# Patient Record
Sex: FEMALE | Race: WHITE | Employment: UNEMPLOYED | URBAN - METROPOLITAN AREA
[De-identification: names, ages, dates, MRNs, and addresses within clinical notes are randomized per-mention and may not be internally consistent; named-entity substitution may affect disease eponyms.]

---

## 2019-12-05 ENCOUNTER — HOSPITAL ENCOUNTER (OUTPATIENT)
Dept: PHYSICAL THERAPY | Age: 44
Discharge: HOME | End: 2019-12-05
Payer: OTHER GOVERNMENT

## 2019-12-05 PROCEDURE — 97110 THERAPEUTIC EXERCISES: CPT

## 2019-12-05 PROCEDURE — 97162 PT EVAL MOD COMPLEX 30 MIN: CPT

## 2019-12-05 NOTE — PROGRESS NOTES
[address] PHYSICAL THERAPY AT [facility]  [address] [facility] [address], [place], [place] [address]  Phone [phone]  Fax [phone] / [address] [address] PHYSICAL THERAPY SERVICES  Patient Name: Bautista Romero : 1975   Medical   Diagnosis: Unspecified urinary incontinence [R32] Treatment Diagnosis: Urinary incontinence   Onset Date: Chronic     Referral Source: Mihcael Wilkinson MD [facility]): 2019   Prior Hospitalization: See medical history Provider #: [id number]   Prior Level of Function: Functionally I   Comorbidities: Hx of ACL repair, plantar fascitis,    Medications: Verified on Patient Summary List   The Plan of Care and following information is based on the information from the initial evaluation.   =================================================================================  Assessment / key information:  Patient is a 52 yo female  with vaginal deliveries (all large 9+ pound babies with tearing and stitching present with all) who presents to [facility] with diagnosis of urinary incontinence. Patient reports 10 year history of stress incontinence which started after delivering her third child (children range in age from 5-14). Over the last 1-2 years she reports that the frequency and volume of incontinence have increased. Patient wears Maxi pads for protection. Patient reports incontinence occurs with light activity, transferring, walking, laughing, coughing or jumping. Patient presents to PT with significant impaired strength of pelvic floor muscles scoring 2/2/2//3 on PERF. On biofeedback there was low W-R rise fast twitch contraction at 5.18 microvolts and low W-R slow twitch contraction at 5.77 microvolts. Patient scored 44 on FOTO/urinary problem indicating decreased quality of life. Musculoskeletal screen reveals decreased Core and B hip strength at 4-/5.    Patient can benefit from PT for retraining of muscle control and relaxation on biofeedback to increase pelvic floor muscle strength, decrease urinary incontinence, urgency and nocturia.    =================================================================================  Eval Complexity: History: HIGH Complexity :3+ comorbidities / personal factors will impact the outcome/ POC Exam:MEDIUM Complexity : 3 Standardized tests and measures addressing body structure, function, activity limitation and / or participation in recreation  Presentation: MEDIUM Complexity : Evolving with changing characteristics  Clinical Decision Making:MEDIUM Complexity : FOTO score of 26-74Overall Complexity:MEDIUM  Problem List: Decreased pelvic floor mm awareness, Decreased pelvic floor mm strength, Use of accessory muscles, Hypertonus of pelvic floor and Urinary urgency  Treatment Plan may include any combination of the following:   Therapeutic exercise, Urge suppression techniques, Neuromuscular re-education, Manual therapy, Physical agent/modality and Patient education  Patient / Family readiness to learn indicated by: asking questions and interest  Persons(s) to be included in education: patient (P)  Barriers to Learning/Limitations: None  Measures taken:    Patient Goal (s): Less Incontinence   Patient self reported health status: fair  Rehabilitation Potential: good    Short Term Goals: To be accomplished in  4  treatments:  1. Patient will demonstrate I and compliance with performing pelvic floor strengthening/relaxing exercises 3 times daily to demonstrate active role in rehab process. 2. Patient will report 25% subjective improvement in urinary incontinence with activity and 25% decrease in frequency of urge incontinence symptoms. 3. Patient will be I with use of urge suppression techniques to allow for improved quality of life. Long Term Goals: To be accomplished in  8  treatments:  1.  Patient will improve FOTO score from 54/100 to 62/100 to indicate improved pelvic symptoms and overall improved quality of life. 2. Patient will increase net rise of fast twitch contraction to 10 microvolts to increase continence and allow for return to PLOF. 3. Patient will increase net rise of slow twitch contraction to 15 microvolts to increase continence and allow for return to PLOF. Frequency / Duration:   Patient to be seen  1-2  times per week for 4-8  treatments:  Patient/ Caregiver education and instruction: Diagnosis, prognosis, Proper Voiding Habits, Diet, Pain Management, Exercises and Bladder Retraining  G-Codes (GP): MADHAVI  Therapist Signature: Juvenal Lopez PT, DPT Date: 60/8/8668   Certification Period: NA Time: 3:54 PM   ===========================================================================================  I certify that the above Physical Therapy Services are being furnished while the patient is under my care. I agree with the treatment plan and certify that this therapy is necessary. Physician Signature:        Date:       Time:     Please sign and return to [facility] or you may fax the signed copy to [phone]. Thank you.

## 2019-12-13 ENCOUNTER — HOSPITAL ENCOUNTER (OUTPATIENT)
Dept: PHYSICAL THERAPY | Age: 44
Discharge: HOME | End: 2019-12-13
Payer: OTHER GOVERNMENT

## 2019-12-13 PROCEDURE — 97110 THERAPEUTIC EXERCISES: CPT

## 2019-12-13 PROCEDURE — 97112 NEUROMUSCULAR REEDUCATION: CPT

## 2019-12-13 NOTE — PROGRESS NOTES
PT DAILY TREATMENT NOTE 8-    Patient Name: Maira Carlin  Date:2019  : 1975  [x]  Patient  Verified  Payor: BIB / Plan: Fady Monte 72 / Product Type: Allie Majors /    In time:1030  Out time: 1115  Total Treatment Time (min): 45  Visit #: 2 of 4-8    Treatment Area: Unspecified urinary incontinence [R32]    SUBJECTIVE    Pain Level (0-10 scale): 0  Any medication changes, allergies to medications, adverse drug reactions, diagnosis change, or new procedure performed?: [x] No    [] Yes (see summary sheet for update)  Subjective functional status/changes:   [] No changes reported  Patient retold subjective history. Reports not being able to be compliant with HEP over the last week due to time constraints. OBJECTIVE  15 min Therapeutic Exercise:  [x] See flow sheet :  Discussed urge suppression techniques and pelvic floor strengthening exercises. Issued HEP    [x]  Pelvic floor strengthening                 []  Pelvic floor downtraining  [x]  Quality pelvic floor contractions       []  Relaxation techniques  [x]  Urge suppression exercises  []  Other:  Rationale: increase strength and improve coordination  to improve the patients ability to maintain continence to improve overall quality of life. 30 min Neuromuscular Re-education:  [x]  See flow sheet : Neuro muscular training to pelvic floor for slow and fast twitch muscle groups in supine position with biofeedback - visual   [x]  Pelvic floor strengthening                 []  Pelvic floor downtraining  [x]  Quality pelvic floor contractions       []  Relaxation techniques  []  Urge suppression exercises  []  Other:  Rationale: increase strength and improve coordination  to improve the patients ability to maintain continence to improve overall quality of life.      With   [] TE   [] TA   [] neuro  [] manual   [] other: Patient Education: [x] Review HEP    [] Progressed/Changed HEP based on:   [] positioning   [] body mechanics [] transfers   [] heat/ice application    [] other:        Other Objective/Functional Measures:   Patient tolerated treatment with biofeedback well. Please see below for objective measurements. EMG Evaluation:  [] N/A [] Deferred secondary to:   Channel A: Electrode type:  [] Internal    [x] Surface    [] Vaginal    [] Rectal  Channel B: Electrode location: Left hip medial to ASIS    Slow Twitch: (10 second hold, 10 second rest)  SUPINE POSITION x10   Channel A (microvolts): 9.3 Quality:[] Quick/slow rise [x] Low net rise  Net rise (microvolts): 5.77  [x] Slow Relaxation [x] Incoordination       [] Unable to contract [] Fatigues at (sec):       [] Elevated baseline between contractions    Channel B (microvolts):1.5 Use of Accessory Muscles: [x] Minimal  Net rise (microvolts): 0.06  [] Moderate  [] Excessive       [] No use of accessory muscles    Fast Twitch (2 second hold, 4 second rest) SUPINE x 20   Channel A (microvolts): 9.8 Quality:[] Quick/slow rise [x] Low net rise  Net rise (microvolts):  5.18 [x] Slow Relaxation [x] Incoordination       [] Unable to contract [] Fatigues at (sec):       [] Elevated baseline between contractions    Channel B (microvolts): 1.2 Use of Accessory Muscles: [x] Minimal  Net rise (microvolts): -0.05  [] Moderate  [] Excessive       [] No use of accessory muscles    Pain Level (0-10 scale) post treatment: 0    ASSESSMENT/Changes in Function: Patient demonstrates impaired pelvic floor strength and coordination. Continue per POC to allow patient to maintain continence to improve overall quality of life. Patient will continue to benefit from skilled PT services to modify and progress therapeutic interventions, address strength deficits and analyze and address soft tissue restrictions to attain remaining goals.       [x]  Decrease # of leaks   [x] No change []  Improving [] Resolved     []  Decrease hypertonus [] No change []  Improving [] Resolved     [x]  Increase void interval [x] No change []  Improving [] Resolved     [x]  Increase PF strength [x] No change []  Improving [] Resolved     [x]  Increase PF endurance [x] No change []  Improving [] Resolved     []  Increase endurance [] No change []  Improving [] Resolved     []  Decrease # of pads [] No change []  Improving [] Resolved     []  Decrease pain [] No change []  Improving [] Resolved     [x]  Increased coordination [x] No change []  Improving [] Resolved     []  Increased Bowel Frequency [] No change []  Improving [] Resolved       [x]  See Plan of Care  []  See progress note/recertification  []  See Discharge Summary         Progress towards goals / Updated goals:  First session following eval . Reassess goals NV    PLAN  []  Upgrade activities as tolerated     [x]  Continue plan of care  []  Update interventions per flow sheet       []  Discharge due to:_  [x]  Other:_  Assess effects of first treatment RAIZA Gonzalez PT, DPT 12/13/2019  10:53 AM

## 2019-12-27 ENCOUNTER — HOSPITAL ENCOUNTER (OUTPATIENT)
Dept: PHYSICAL THERAPY | Age: 44
Discharge: HOME | End: 2019-12-27
Payer: OTHER GOVERNMENT

## 2019-12-27 PROCEDURE — 97110 THERAPEUTIC EXERCISES: CPT

## 2019-12-27 PROCEDURE — 97112 NEUROMUSCULAR REEDUCATION: CPT

## 2019-12-27 NOTE — PROGRESS NOTES
PT DAILY TREATMENT NOTE 8-     Patient Name: Krishna Quintana  Date:2019  : 1975  [x]? Patient  Verified  Payor:  / Plan: Ravi Garter / Product Type:  /    In time: 36 Out time: 905  Total Treatment Time (min): 45  Visit #: 3 of  4-8     Treatment Area: Unspecified urinary incontinence [R32]     SUBJECTIVE    Pain Level (0-10 scale): 0  Any medication changes, allergies to medications, adverse drug reactions, diagnosis change, or new procedure performed?: [x]? No    []? Yes (see summary sheet for update)  Subjective functional status/changes:   []? No changes reported  Patent reports somewhat compliance with HEP. Difficult with holiday activities.      OBJECTIVE  15 min Therapeutic Exercise:  [x]? See flow sheet :  Discussed urge suppression techniques and pelvic floor strengthening exercises. Issued HEP    [x]? Pelvic floor strengthening                 []?  Pelvic floor downtraining  [x]? Quality pelvic floor contractions       []? Relaxation techniques  [x]? Urge suppression exercises  []? Other:  Rationale: increase strength and improve coordination  to improve the patients ability to maintain continence to improve overall quality of life.                     30 min Neuromuscular Re-education:  [x]? See flow sheet : Neuro muscular training to pelvic floor for slow and fast twitch muscle groups in supine position with biofeedback - visual   [x]? Pelvic floor strengthening                 []?  Pelvic floor downtraining  [x]? Quality pelvic floor contractions       []? Relaxation techniques  []? Urge suppression exercises  []? Other:  Rationale: increase strength and improve coordination  to improve the patients ability to maintain continence to improve overall quality of life.      With   []? TE   []? TA   []? neuro  []? manual   []? other: Patient Education: [x]? Review HEP    []? Progressed/Changed HEP based on:   []? positioning   []?  body mechanics []? transfers   []? heat/ice application    []? other:          Other Objective/Functional Measures:              Patient tolerated treatment with biofeedback well. Please see below for objective measurements. EMG Evaluation:  []? N/A      []? Deferred secondary to:   Channel A: Electrode type:  []? Internal    [x]? Surface    []? Vaginal    []? Rectal  Channel B: Electrode location: Left hip medial to ASIS     Slow Twitch: (10 second hold, 10 second rest)  SUPINE POSITION x10 USED ROLLING GRAPH SCREEN  Channel A (microvolts):  8.5    Quality:[]? Quick/slow rise      [x]? Low net rise  Net rise (microvolts):   6.13                [x]? Slow Relaxation     [x]? Incoordination                                                              []? Unable to contract  []? Fatigues at (sec):                                                              []? Elevated baseline between contractions          Slow Twitch: (10 second hold, 10 second rest)  Hooklying POSITION x10 USED ROLLING GRAPH SCREEN  Channel A (microvolts):  8.5   Quality:[]? Quick/slow rise      [x]? Low net rise  Net rise (microvolts):    7.31               [x]? Slow Relaxation     [x]? Incoordination                                                              []? Unable to contract  []? Fatigues at (sec):                                                              []? Elevated baseline between contractions         Slow Twitch: (10 second hold, 10 second rest)  seated POSITION x10 USED Bar GRAPH SCREEN  Channel A (microvolts):  2.0    Quality:[]? Quick/slow rise      [x]? Low net rise  Net rise (microvolts):   1.53               [x]? Slow Relaxation     [x]? Incoordination                                                              []? Unable to contract  []?  Fatigues at (sec):                                                              []? Elevated baseline between contractions       Pain Level (0-10 scale) post treatment: 0     ASSESSMENT/Changes in Function: Patient continues to demonstrate impaired pelvic floor strength and coordination. PT initiated biofeedback for slow twitch fibers in hooklying and seated positions. PAtient demonstrates significantly decreased average contraction and net rise in seated compared to supine/hooklying. Continue per POC to allow patient to maintain continence to improve overall quality of life. Patient will continue to benefit from skilled PT services to modify and progress therapeutic interventions, address strength deficits and analyze and address soft tissue restrictions to attain remaining goals.        [x]? Decrease # of leaks    [x]? No change []? Improving []? Resolved      []? Decrease hypertonus []? No change []? Improving []? Resolved      [x]? Increase void interval [x]? No change []? Improving []? Resolved      [x]? Increase PF strength [x]? No change []? Improving []? Resolved      [x]? Increase PF endurance [x]? No change []? Improving []? Resolved      []? Increase endurance []? No change []? Improving []? Resolved      []? Decrease # of pads []? No change []? Improving []? Resolved      []? Decrease pain []? No change []? Improving []? Resolved      [x]? Increased coordination [x]? No change []? Improving []? Resolved      []? Increased Bowel Frequency []? No change []? Improving []? Resolved         [x]? See Plan of Care  []? See progress note/recertification  []? See Discharge Summary         Progress towards goals / Updated goals: · Short Term Goals: To be accomplished in  4  treatments:  · 1. Patient will demonstrate I and compliance with performing pelvic floor strengthening/relaxing exercises 3 times daily to demonstrate active role in rehab process. Progressing with partial compliance reported by patient 12/27/2019   · 2. Patient will report 25% subjective improvement in urinary incontinence with activity and 25% decrease in frequency of urge incontinence symptoms. · 3. Patient will be I with use of urge suppression techniques to allow for improved quality of life.      · Long Term Goals: To be accomplished in  8  treatments:  · 1. Patient will improve FOTO score from 54/100 to 62/100 to indicate improved pelvic symptoms and overall improved quality of life. · 2. Patient will increase net rise of fast twitch contraction to 10 microvolts to increase continence and allow for return to PLOF.   · 3. Patient will increase net rise of slow twitch contraction to 15 microvolts to increase continence and allow for return to PLOF. Ongoing with net rise ~6.13 uV in supine. 12/27/2019      PLAN  [x]? Upgrade activities as tolerated     [x]? Continue plan of care  []? Update interventions per flow sheet       []? Discharge due to:_  []?   Other:_  Juvenal Lopez, PT, DPT    12/27/2019                  10:53 AM

## 2020-01-03 ENCOUNTER — HOSPITAL ENCOUNTER (OUTPATIENT)
Dept: PHYSICAL THERAPY | Age: 45
Discharge: HOME | End: 2020-01-03
Payer: OTHER GOVERNMENT

## 2020-01-03 PROCEDURE — 97112 NEUROMUSCULAR REEDUCATION: CPT

## 2020-01-03 PROCEDURE — 97110 THERAPEUTIC EXERCISES: CPT

## 2020-01-03 NOTE — PROGRESS NOTES
PT DAILY TREATMENT NOTE 8-     Patient Name: Tarah Ferreira  Date:2020  : 1975  [x]? Patient  Verified  Payor: BIB / Plan: Maci Walton / Product Type:  /    In time: 250 Out time: 336  Total Treatment Time (min): 46  Visit #: 4 of  4-8     Treatment Area: Unspecified urinary incontinence [R32]     SUBJECTIVE    Pain Level (0-10 scale): 0  Any medication changes, allergies to medications, adverse drug reactions, diagnosis change, or new procedure performed?: [x]? No    []? Yes (see summary sheet for update)  Subjective functional status/changes:   [x]? No changes reported       OBJECTIVE  16 min Therapeutic Exercise:  [x]? See flow sheet :  Reviewed urge suppression and relaxation techniques and pelvic floor strengthening exercises. Issued updated HEP    [x]? Pelvic floor strengthening                 []?  Pelvic floor downtraining  [x]? Quality pelvic floor contractions       []? Relaxation techniques  [x]? Urge suppression exercises  []? Other:  Rationale: increase strength and improve coordination  to improve the patients ability to maintain continence to improve overall quality of life.                     30 min Neuromuscular Re-education:  [x]? See flow sheet : Neuro muscular training to pelvic floor for slow and fast twitch muscle groups in supine position with biofeedback - visual   [x]? Pelvic floor strengthening                 []?  Pelvic floor downtraining  [x]? Quality pelvic floor contractions       []? Relaxation techniques  []? Urge suppression exercises  []? Other:  Rationale: increase strength and improve coordination  to improve the patients ability to maintain continence to improve overall quality of life.      With   []? TE   []? TA   []? neuro  []? manual   []? other: Patient Education: [x]? Review HEP    []? Progressed/Changed HEP based on:   []? positioning   []? body mechanics   []? transfers   []? heat/ice application    []?  other:       Other Objective/Functional Measures:              Patient tolerated treatment with biofeedback well. Please see below for objective measurements. EMG Evaluation:  []? N/A      []? Deferred secondary to:   Channel A: Electrode type:  []? Internal    [x]? Surface    []? Vaginal    []? Rectal  Channel B: Electrode location: Left hip medial to ASIS       Slow Twitch: (10 second hold, 10 second rest)  Hooklying POSITION x10 USED ROLLING GRAPH SCREEN  Channel A (microvolts):  15.4   Quality:[]? Quick/slow rise      [x]? Low net rise  Net rise (microvolts):    11.00               [x]? Slow Relaxation     [x]? Incoordination                                                              []? Unable to contract  []? Fatigues at (sec):                                                              []? Elevated baseline between contractions         Slow Twitch: (10 second hold, 10 second rest)  seated POSITION x10 USED Bar GRAPH SCREEN  Channel A (microvolts):  5.6    Quality:[]? Quick/slow rise      [x]? Low net rise  Net rise (microvolts):   3.36             [x]? Slow Relaxation     [x]? Incoordination                                                              []? Unable to contract  []? Fatigues at (sec):                                                              []? Elevated baseline between contractions       Pain Level (0-10 scale) post treatment: 0     ASSESSMENT/Changes in Function: Patient is demonstrating progress with improved avearge contraction and net rise values for hooklying and seated positions for slow twitch fibers. Patent also demonstrates decreased resting tone in seated position at 2.2uV. Continue per POC to allow patient to maintain continence to improve overall quality of life.    Patient will continue to benefit from skilled PT services to modify and progress therapeutic interventions, address strength deficits and analyze and address soft tissue restrictions to attain remaining goals.        [x]? Decrease # of leaks    [x]? No change []? Improving []? Resolved      []? Decrease hypertonus []? No change []? Improving []? Resolved      [x]? Increase void interval []? No change [x]? Improving []? Resolved      [x]? Increase PF strength []? No change [x]? Improving []? Resolved      [x]? Increase PF endurance []? No change [x]? Improving []? Resolved      []? Increase endurance []? No change []? Improving []? Resolved      []? Decrease # of pads []? No change []? Improving []? Resolved      []? Decrease pain []? No change []? Improving []? Resolved      [x]? Increased coordination [x]? No change [x]? Improving []? Resolved      []? Increased Bowel Frequency []? No change []? Improving []? Resolved         [x]? See Plan of Care  []? See progress note/recertification  []? See Discharge Summary         Progress towards goals / Updated goals: · Short Term Goals: To be accomplished in  4  treatments:  · 1. Patient will demonstrate I and compliance with performing pelvic floor strengthening/relaxing exercises 3 times daily to demonstrate active role in rehab process. Progressing with partial compliance reported by patient 12/27/2019   · 2. Patient will report 25% subjective improvement in urinary incontinence with activity and 25% decrease in frequency of urge incontinence symptoms. · 3. Patient will be I with use of urge suppression techniques to allow for improved quality of life.      · Long Term Goals: To be accomplished in  8  treatments:  · 1. Patient will improve FOTO score from 54/100 to 62/100 to indicate improved pelvic symptoms and overall improved quality of life. · 2. Patient will increase net rise of fast twitch contraction to 10 microvolts to increase continence and allow for return to PLOF.   · 3. Patient will increase net rise of slow twitch contraction to 15 microvolts to increase continence and allow for return to PLOF.   Progressing with 11.0 uV 1/3/2020      PLAN  [x]? Upgrade activities as tolerated     [x]? Continue plan of care  []? Update interventions per flow sheet       []? Discharge due to:_  [x]?   Other:_Reassessment due Zehra [id number], PT, DPT    1/3/2020              10:53 AM

## 2020-01-08 ENCOUNTER — APPOINTMENT (OUTPATIENT)
Dept: PHYSICAL THERAPY | Age: 45
End: 2020-01-08
Payer: OTHER GOVERNMENT

## 2020-01-09 ENCOUNTER — APPOINTMENT (OUTPATIENT)
Dept: PHYSICAL THERAPY | Age: 45
End: 2020-01-09
Payer: OTHER GOVERNMENT

## 2020-01-16 ENCOUNTER — APPOINTMENT (OUTPATIENT)
Dept: PHYSICAL THERAPY | Age: 45
End: 2020-01-16
Payer: OTHER GOVERNMENT

## 2020-01-23 ENCOUNTER — HOSPITAL ENCOUNTER (OUTPATIENT)
Dept: PHYSICAL THERAPY | Age: 45
End: 2020-01-23
Payer: OTHER GOVERNMENT

## 2020-02-12 ENCOUNTER — HOSPITAL ENCOUNTER (OUTPATIENT)
Dept: PHYSICAL THERAPY | Age: 45
Discharge: HOME | End: 2020-02-12
Payer: OTHER GOVERNMENT

## 2020-02-12 PROCEDURE — 97110 THERAPEUTIC EXERCISES: CPT

## 2020-02-12 PROCEDURE — 97112 NEUROMUSCULAR REEDUCATION: CPT

## 2020-02-12 NOTE — PROGRESS NOTES
PT DAILY TREATMENT NOTE 8-     Patient Name: Brooklyn Castellon  Date: 2020  : 1975  [x]? Patient  Verified  Payor: BIB / Plan: [facility] / Product Type:  /    In time: 130 Out time: 219  Total Treatment Time (min):  49  Visit #: 5 of  4-8     Treatment Area: Unspecified urinary incontinence [R32]     SUBJECTIVE    Pain Level (0-10 scale): 0  Any medication changes, allergies to medications, adverse drug reactions, diagnosis change, or new procedure performed?: [x]? No    []? Yes (see summary sheet for update)  Subjective functional status/changes:   [x]? No changes reported  Patient reports that she has been absent from PT due to personal and family illness. She reports about a 25% improvement from start of care in amount and frequency of leakage.      OBJECTIVE  19 min Therapeutic Exercise:  [x]? See flow sheet :  Reviewed HEP to include urge suppression and relaxation techniques and pelvic floor strengthening exercises. [x]? Pelvic floor strengthening                 []?  Pelvic floor downtraining  [x]? Quality pelvic floor contractions       []? Relaxation techniques  [x]? Urge suppression exercises  []? Other:  Rationale: increase strength and improve coordination  to improve the patients ability to maintain continence to improve overall quality of life.                     30 min Neuromuscular Re-education:  [x]? See flow sheet : Neuro muscular training to pelvic floor for slow twitch muscle groups in supine and hooklying position with biofeedback - visual   [x]? Pelvic floor strengthening                 []?  Pelvic floor downtraining  [x]? Quality pelvic floor contractions       []? Relaxation techniques  []? Urge suppression exercises  []? Other:  Rationale: increase strength and improve coordination  to improve the patients ability to maintain continence to improve overall quality of life.      With   []? TE   []? TA   []? neuro  []? manual   []? other: Patient Education: [x]? Review HEP    []? Progressed/Changed HEP based on:   []? positioning   []? body mechanics   []? transfers   []? heat/ice application    []? other:          Other Objective/Functional Measures:              Patient tolerated treatment with biofeedback well. Please see below for objective measurements. EMG Evaluation:  []? N/A      []? Deferred secondary to:   Channel A: Electrode type:  []? Internal    [x]? Surface    []? Vaginal    []? Rectal  Channel B: Electrode location: Left hip medial to ASIS       Slow Twitch: (10 second hold, 10 second rest)  Seated POSITION x10   Channel A (microvolts):  2.6uV   Quality:[]? Quick/slow rise      [x]? Low net rise  Net rise (microvolts):    1.19uV              [x]? Slow Relaxation     [x]? Incoordination                                                              []? Unable to contract  []? Fatigues at (sec):                                                              []? Elevated baseline between contractions     Slow Twitch: (10 second hold, 10 second rest)  Hooklying POSITION x10   Channel A (microvolts):  13.2    Quality:[]? Quick/slow rise      [x]? Low net rise  Net rise (microvolts):   2.82             [x]? Slow Relaxation     [x]? Incoordination                                                              []? Unable to contract  []? Fatigues at (sec):                                                              []? Elevated baseline between contractions       Pain Level (0-10 scale) post treatment: 0     ASSESSMENT/Changes in Function: Patient demonstrates progress in pelvic floor strength and coordination. She will continue to benefit from skilled PT to address pelvic floor strength, endurance, and coordination to allow for decreased frequency and amount of leakage allowing for improved quality of life. Continue per POC to allow patient to maintain continence to improve overall quality of life.    Patient will continue to benefit from skilled PT services to modify and progress therapeutic interventions, address strength deficits and analyze and address soft tissue restrictions to attain remaining goals.        [x]? Decrease # of leaks    [x]? No change []? Improving []? Resolved      []? Decrease hypertonus []? No change []? Improving []? Resolved      [x]? Increase void interval []? No change [x]? Improving []? Resolved      [x]? Increase PF strength []? No change [x]? Improving []? Resolved      [x]? Increase PF endurance []? No change [x]? Improving []? Resolved      []? Increase endurance []? No change []? Improving []? Resolved      []? Decrease # of pads []? No change []? Improving []? Resolved      []? Decrease pain []? No change []? Improving []? Resolved      [x]? Increased coordination [x]? No change [x]? Improving []? Resolved      []? Increased Bowel Frequency []? No change []? Improving []? Resolved         []? See Plan of Care  [x]? See progress note/recertification  []? See Discharge Summary         Progress towards goals / Updated goals: · Short Term Goals: To be accomplished in  4  treatments:  · 1. Patient will demonstrate I and compliance with performing pelvic floor strengthening/relaxing exercises 3 times daily to demonstrate active role in rehab process. Progressing with partial compliance reported by patient 12/27/2019   · 2. Patient will report 25% subjective improvement in urinary incontinence with activity and 25% decrease in frequency of urge incontinence symptoms. RESOLVED 2/12/2020  · 3. Patient will be I with use of urge suppression techniques to allow for improved quality of life. Progressing per patient report. 2/12/2020      · Long Term Goals: To be accomplished in  8  treatments:  · 1. Patient will improve FOTO score from 44/100 to 56/100 (urinary problem) to indicate improved pelvic symptoms and overall improved quality of life. Progressing 53/100 2/12/2020  · 2.  Patient will increase net rise of fast twitch contraction to 10 microvolts to increase continence and allow for return to PLOF. Not assessed this session. · 3. Patient will increase net rise of slow twitch contraction to 15 microvolts to increase continence and allow for return to PLOF. Progressing with 11.0 uV 1/3/2020      PLAN  [x]? Upgrade activities as tolerated     [x]? Continue plan of care  []? Update interventions per flow sheet       []? Discharge due to:_  []? Other:Continue with POC 1-2 times a week for 4-8 treatments.    Ofelia Luciano, PT, DPT    2/12/2020              10:53 AM

## 2020-02-19 ENCOUNTER — HOSPITAL ENCOUNTER (OUTPATIENT)
Dept: PHYSICAL THERAPY | Age: 45
Discharge: HOME | End: 2020-02-19
Payer: OTHER GOVERNMENT

## 2020-02-19 PROCEDURE — 97110 THERAPEUTIC EXERCISES: CPT

## 2020-02-19 PROCEDURE — 97112 NEUROMUSCULAR REEDUCATION: CPT

## 2020-02-19 NOTE — PROGRESS NOTES
PF Daily Treatment Note  Patient Name: Serafin Rushing  Date: 2019  []  Patient  Verified  Insurance:Payor:  / Plan: [facility] / Product Type:  /   In time:340   Out time:415  Total Treatment Time (min): 35  Visit #: 1 of 4-8    Treatment Area: [x] Pelvic Floor     [] Other:    SUBJECTIVE  Pain Level (0-10 scale): 0/10    Any medication changes, allergies to medications, adverse drug reactions, diagnosis change, or new procedure performed?: [x] No    [] Yes (see summary sheet for update)   Patient is a 54 yo female  with vaginal deliveries (all large 9+ pound babies with tearing and stitching present with all) who presents to [facility] with diagnosis of urinary incontinence. Patient reports 10 year history of stress incontinence which started after delivering her third child (children range in age from 5-14). Over the last 1-2 years she reports that the frequency and volume of incontinence have increased. Patient wears Maxi pads for protection. Patient reports incontinence occurs with light activity, transferring, walking, laughing, coughing or jumping. Pelvic Floor Dysfunction Evaluation   Patient presents to PT with significant impaired strength of pelvic floor muscles scoring 2/2/2//3 on PERF. On biofeedback there was low W-R rise fast twitch contraction at 5.18 microvolts and low W-R slow twitch contraction at 5.77 microvolts. Patient scored 44 on FOTO/urinary problem indicating decreased quality of life. Musculoskeletal screen reveals decreased Core and B hip strength at 4-/5. Skin Integrity:  [x] Healthy [] Red  [] Labia Atrophy [] Fragile    Sensation: [x] Intact [] Diminished:    Muscle Bulk: [x] Symmetrical  [] Well-developed [] Atrophied:  []L   []R   []B    Patient has failed previous pelvic floor muscle training?   [] Yes    [x] No    EMG Evaluation:  [] N/A [x] Deferred secondary to: time constraint    OBJECTIVE  25 min Eval    10 min Therapeutic Exercise:  [x] See flow sheet :   [x]  Pelvic floor strengthening                 []  Pelvic floor downtraining  [x]  Quality pelvic floor contractions       []  Relaxation techniques  []  Urge suppression exercises  []  Other:              min Patient Education: [x] Review HEP    [] Progressed/Changed HEP based on:   [] positioning   [] body mechanics   [] transfers   [] heat/ice application        Other Objective/Functional Measures:   Patient presents to PT with significant impaired strength of pelvic floor muscles scoring 2/2/2//3 on PERF. On biofeedback there was low W-R rise fast twitch contraction at 5.18 microvolts and low W-R slow twitch contraction at 5.77 microvolts. Patient scored 44 on FOTO/urinary problem indicating decreased quality of life. Musculoskeletal screen reveals decreased Core and B hip strength at 4-/5. Pain Level (0-10 scale) post treatment: 0/10    ASSESSMENT/Changes in Function: Patient can benefit from PT for retraining of muscle control to increase pelvic floor muscle strength, decrease urinary incontinence and urgency. []  Decrease # of leaks   [] No change []  Improving [] Resolved     []  Decrease hypertonus [] No change []  Improving [] Resolved     []  Increase void interval [] No change []  Improving [] Resolved     []  Increase PF strength [] No change []  Improving [] Resolved     []  Increase PF endurance [] No change []  Improving [] Resolved     []  Increase endurance [] No change []  Improving [] Resolved     []  Decrease # of pads [] No change []  Improving [] Resolved     []  Decrease pain [] No change []  Improving [] Resolved         [x]  See Plan of Care for established goals          PLAN  []  Upgrade activities as tolerated     []  Continue plan of care  []  Update interventions per flow sheet       []  Discharge due to:_  [x]  Other:_  Patient to be seen 1-2 times a week for 4-8 treatments.        Julio César Hester, PT 12/5/2019 1:18 PM

## 2020-02-19 NOTE — PROGRESS NOTES
PT DAILY TREATMENT NOTE -    Patient Name: Jahaira Gutiérrez  Date:2020  : 1975  [x]  Patient  Verified  Payor: BIB / Plan: [facility] / Product Type:  /    In time:928  Out time:1016  Total Treatment Time (min): 48    Visit #: 1 of -8    Treatment Area: Unspecified urinary incontinence [R32]    SUBJECTIVE    Pain Level (0-10 scale): 0  Any medication changes, allergies to medications, adverse drug reactions, diagnosis change, or new procedure performed?: [x] No    [] Yes (see summary sheet for update)  Subjective functional status/changes:   [] No changes reported  Patient reports partial compliance with HEP since her last session. OBJECTIVE  18 min Therapeutic Exercise:  [x] See flow sheet : Initiated core strengthening and PF relaxation techniques. [x]  Pelvic floor strengthening                 []  Pelvic floor downtraining  []  Quality pelvic floor contractions       [x]  Relaxation techniques  []  Urge suppression exercises  [x]  Other:  Rationale: increase strength and improve coordination  to improve the patients ability to maintain continence to improve overall quality of life. 30 min Neuromuscular Re-education:  [x]  See flow sheet : Biofeedback training with slow twitch fibers   [x]  Pelvic floor strengthening                 [x]  Pelvic floor downtraining  [x]  Quality pelvic floor contractions       [x]  Relaxation techniques  []  Urge suppression exercises  []  Other:  Rationale: increase strength and improve coordination  to improve the patients ability to maintain continence to improve overall quality of life.              With   [] TE   [] TA   [] neuro  [] manual   [] other: Patient Education: [x] Review HEP    [] Progressed/Changed HEP based on:   [] positioning   [] body mechanics   [] transfers   [] heat/ice application    [] other:        Other Objective/Functional Measures:  Introduced core strengthening therex this session to include PPT, Bridge, and Clams.               [id number] tolerated treatment with biofeedback well. Please see below for objective measurements. EMG Evaluation:  []?? N/A      []? ? Deferred secondary to:   Channel A: Electrode type:  []?? Internal    [x]? ? Surface    []? ? Vaginal    []? ? Rectal  Channel B: Electrode location: Left hip medial to ASIS        Slow Twitch: (5 second hold, 10 second rest) Supine POSITION x10   Channel A (microvolts):  10.4uV   Quality:[]? ? Quick/slow rise      [x]? ? Low net rise  Net rise (microvolts):    4.57uV              [x]? ? Slow Relaxation     [x]? ? Incoordination  RESTING TONE 5. 9uV                                       []? ? Unable to contract  []? ? Fatigues at (sec):                                                              [x]? ? Elevated baseline between contractions     Slow Twitch: (5 second hold, 10 second rest)  Hooklying POSITION x10   Channel A (microvolts):  14.0    Quality:[]? ? Quick/slow rise      [x]? ? Low net rise  Net rise (microvolts):   2.72          [x]? ? Slow Relaxation     [x]? ? Incoordination   RESTING TONE 11. 3uV            []? ? Unable to contract  []? ? Fatigues at (sec):                                                       [x]? ? Elevated baseline between contractions       Pain Level (0-10 scale) post treatment: 0/10    ASSESSMENT/Changes in Function: Patient tolerated core strengthening exercises well with no c/o pain or fatigue. Moderate cueing needed for form. Patient demonstrates high resting tone in supine position this session. PT address this with relaxation techniques and diaphragmatic breathing. Continue per POC. Patient will continue to benefit from skilled PT services to modify and progress therapeutic interventions, address strength deficits and analyze and address soft tissue restrictions to attain remaining goals.       [x]  Decrease # of leaks   [] No change [x]  Improving [] Resolved     [x]  Decrease hypertonus [x] No change []  Improving [] Resolved     [x]  Increase void interval [] No change [x]  Improving [] Resolved     [x]  Increase PF strength [] No change [x]  Improving [] Resolved     [x]  Increase PF endurance [] No change [x]  Improving [] Resolved     []  Increase endurance [] No change []  Improving [] Resolved     []  Decrease # of pads [] No change []  Improving [] Resolved     []  Decrease pain [] No change []  Improving [] Resolved     []  Increased coordination [] No change []  Improving [] Resolved     []  Increased Bowel Frequency [] No change []  Improving [] Resolved       []  See Plan of Care  [x]  See progress note/recertification  []  See Discharge Summary         Progress towards goals / Updated goals:  First session following reassessment. Address updated goals NV    PLAN  [x]  Upgrade activities as tolerated     [x]  Continue plan of care  []  Update interventions per flow sheet       []  Discharge due to:_  [x]  Other:_ Assess effects of relaxation training.       Georgette Jimenez, PT 2/19/2020  8:25 AM

## 2020-02-24 NOTE — PROGRESS NOTES
[address] [facility]  [address] [place] [address],[address], [place], [address]  Phone [phone]  Fax [phone]  PROGRESS NOTE  Patient Name: Joie Hutson : 1975   Treatment/Medical Diagnosis: Unspecified urinary incontinence [R32]   Referral Source: Matt Blair MD     Date of Initial Visit: 2019 Attended Visits: 5 Missed Visits: 4 weeks due to family illness   SUMMARY OF TREATMENT  PT has consisted of pelvic floor relaxation/strengthening via biofeedback, patient education as to pelvic floor anatomy and function , urge suppression techniques and home exercise program to improve pelvic floor strength, endurance and coordination. CURRENT STATUS  Patient has made moderate progress in PT with 2 or 6 Long term goals met. Functional progress Includes patient reporting 25% improvement in decreased frequency and amount of leakage. .  Patient demonstrates improved pelvic floor muscle strength and endurance and overall improved coordination. Patient was absent from PT for a month due to family illness and demonstrates some regression. GOALS/MEASURE OF Progress:   · Short Term Goals: To be accomplished in  4  treatments:  · 1. Patient will demonstrate I and compliance with performing pelvic floor strengthening/relaxing exercises 3 times daily to demonstrate active role in rehab process. RESOLVED 2020 HLL   · 2. Patient will report 25% subjective improvement in urinary incontinence with activity and 25% decrease in frequency of urge incontinence symptoms. RESOLVED 2020  · 3. Patient will be I with use of urge suppression techniques to allow for improved quality of life. Progressing per patient report. 2020      · Long Term Goals: To be accomplished in  8  treatments:  · 1. Patient will improve FOTO score from 44/100 to 56/100 (urinary problem) to indicate improved pelvic symptoms and overall improved quality of life.  Progressing 53/100 2/12/2020  · 2. Patient will increase net rise of fast twitch contraction to 10 microvolts to increase continence and allow for return to PLOF. Not assessed this session. · 3. Patient will increase net rise of slow twitch contraction to 15 microvolts to increase continence and allow for return to PLOF.  Progressing with 11.0 uV 1/3/2020      New Goals to be achieved in __4-8__  treatments:  1. Patient will be I in urge suppression techniques, relaxation techniques and pelvic floor strengthening exercises 2-3 times daily to demonstrate an active role in rehab process. 2.  Patient will increase score on FOTO/urinary problem  to 56  indicating improved pelvic symptoms and quality of life. 3. Patient will decrease resting tone in seated and hooklying positions to 5uV or less. 4..  Increase net rise of slow twitch contraction to 15 microvolts to increase continence and improve overall quality of life. G-Codes: NA  RECOMMENDATIONS  Continue pelvic floor PT 1-2x week for 4-8 treatments. If you have any questions/comments please contact us directly at [phone]. Thank you for allowing us to assist in the care of your patient. Therapist Signature: Lavelle Jacobo PT, DPT  Date: 2/12/2020     Time: 12:07 PM   NOTE TO PHYSICIAN:  PLEASE COMPLETE THE ORDERS BELOW AND FAX TO   [facility]: [phone]. If you are unable to process this request in 24 hours please contact our office: [phone].    ___ I have read the above report and request that my patient continue as recommended.   ___ I have read the above report and request that my patient continue therapy with the following changes/special instructions:_________________________________________________________   ___ I have read the above report and request that my patient be discharged from therapy.      Physician Signature:        Date:       Time:

## 2020-02-26 ENCOUNTER — APPOINTMENT (OUTPATIENT)
Dept: PHYSICAL THERAPY | Age: 45
End: 2020-02-26
Payer: OTHER GOVERNMENT

## 2020-03-04 ENCOUNTER — APPOINTMENT (OUTPATIENT)
Dept: PHYSICAL THERAPY | Age: 45
End: 2020-03-04
Payer: OTHER GOVERNMENT

## 2020-03-17 ENCOUNTER — HOSPITAL ENCOUNTER (OUTPATIENT)
Dept: PHYSICAL THERAPY | Age: 45
Discharge: HOME | End: 2020-03-17
Payer: OTHER GOVERNMENT

## 2020-03-17 PROCEDURE — 97110 THERAPEUTIC EXERCISES: CPT

## 2020-03-17 PROCEDURE — 97112 NEUROMUSCULAR REEDUCATION: CPT

## 2020-03-17 NOTE — PROGRESS NOTES
PT DAILY TREATMENT NOTE 8-    Patient Name: Brooklyn Castellon  Date:3/17/2020  : 1975  [x]  Patient  Verified  Payor: BIB / Plan: Fady Monte 72 / Product Type:  /    In time: 1108  Out time: 1210  Total Treatment Time (min): 62    Visit #: 2 of 4-8    Treatment Area: Unspecified urinary incontinence [R32]    SUBJECTIVE    Pain Level (0-10 scale): 0  Any medication changes, allergies to medications, adverse drug reactions, diagnosis change, or new procedure performed?: [x] No    [] Yes (see summary sheet for update)  Subjective functional status/changes:   [] No changes reported  Patient reports that she tried to perform the new core strengthening therex over the last month. Patient has not been seen x 1 month due to PT illness and scheduling issued. Patient reports about 40% improvement in symptoms since [facility]     OBJECTIVE  42/34  min Therapeutic Exercise:  [x] See flow sheet : 8 min NC for changing   [x]  Pelvic floor strengthening                 []  Pelvic floor downtraining  []  Quality pelvic floor contractions       [x]  Relaxation techniques  []  Urge suppression exercises  [x]  Other:  Core and hip strengthening program   Rationale: increase strength and improve coordination  to improve the patients ability to maintain continence to improve overall quality of life. 20 min Neuromuscular Re-education:  [x]  See flow sheet : Biofeedback training with slow twitch fibers   [x]  Pelvic floor strengthening                 [x]  Pelvic floor downtraining  [x]  Quality pelvic floor contractions       [x]  Relaxation techniques  []  Urge suppression exercises  []  Other:  Rationale: increase strength and improve coordination  to improve the patients ability to maintain continence to improve overall quality of life.              With   [] TE   [] TA   [] neuro  [] manual   [] other: Patient Education: [x] Review HEP    [] Progressed/Changed HEP based on:   [] positioning   [] body mechanics   [] transfers   [] heat/ice application    [] other:        Other Objective/Functional Measures:  EMG Evaluation:  []?? N/A      []? ? Deferred secondary to:   Channel A: Electrode type:  []?? Internal    [x]? ? Surface    []? ? Vaginal    []? ? Rectal  Channel B: Electrode location: Left hip medial to ASIS        Slow Twitch: (5 second hold, 10 second rest) Supine POSITION x10   Channel A (microvolts):  8.3uV   Quality:[]? ? Quick/slow rise      [x]? ? Low net rise  Net rise (microvolts):    6.84uV              [x]? ? Slow Relaxation     [x]? ? Incoordination  RESTING TONE 1.5uV (decreased)                []? ? Unable to contract  []? ? Fatigues at (sec):                                                              [x]? ? Elevated baseline between contractions     Slow Twitch: (5 second hold, 10 second rest)  Hooklying POSITION x10   Channel A (microvolts):  9.2    Quality:[]? ? Quick/slow rise      [x]? ? Low net rise  Net rise (microvolts):   26.68         [x]? ? Slow Relaxation     [x]? ? Incoordination   RESTING TONE 2.5uV            []? ? Unable to contract  []? ? Fatigues at (sec):                                                       [x]? ? Elevated baseline between contractions       Pain Level (0-10 scale) post treatment: 0/10    ASSESSMENT/Changes in Function: Patient demonstrated decreased resting tone in both supine and hooklying positions. PT had introduced relaxation techniques last session which appear to have been beneficial to patient. Despite decreased Channel A readings, patient demonstrates increased W-R rise primarily due to decreased resting tone. Patient only has had one treatment visit from last reassessment due to PT illness and scehduling issues. Therefore, treatment plan and goals remain the same. Patient will continue to benefit from pelvic rehab to address continence and core/hip strength to allow for improved quality of life.         [x]  Decrease # of leaks   [] No change [x] Improving [] Resolved     [x]  Decrease hypertonus [x] No change []  Improving [] Resolved     [x]  Increase void interval [] No change [x]  Improving [] Resolved     [x]  Increase PF strength [] No change [x]  Improving [] Resolved     [x]  Increase PF endurance [] No change [x]  Improving [] Resolved     []  Increase endurance [] No change []  Improving [] Resolved     []  Decrease # of pads [] No change []  Improving [] Resolved     []  Decrease pain [] No change []  Improving [] Resolved     []  Increased coordination [] No change []  Improving [] Resolved     []  Increased Bowel Frequency [] No change []  Improving [] Resolved       []  See Plan of Care  [x]  See progress note/recertification  []  See Discharge Summary         Progress towards goals / Updated goals: Continue with below goals. 1.  Patient will be I in urge suppression techniques, relaxation techniques and pelvic floor strengthening exercises 2-3 times daily to demonstrate an active role in rehab process. STATUS AT REASSESSMENT: Progressing. Patient reports partial compliance. 3/17/2020  2. Patient will increase score on FOTO/urinary problem to 56 / 100 to indicate improved pelvic symptoms and quality of life. Status at reassessment: 45/100 Ongoing 3/17/2020  3. Patient will decrease resting tone in seated and hooklying positions to 5uV or less to allow for improved quality of contraction and continence. STATUS AT REASSESSMENT: 1.5uV supine and 2.5uV hooklying. Progressing 3/17/2020  4. Patient will increase net rise of slow twitch contraction to 15uV to increase continence and improve overall quality of life. STATUS AT REASSESSMENT: 6.84uV Progressing 3/17/2020    PLAN  [x]  Upgrade activities as tolerated     [x]  Continue plan of care  []  Update interventions per flow sheet       []  Discharge due to:_  [x]  Other:Continue per POC 1 time a week for 4-8 treatments.      Virginia Hilliard, PT 3/17/2020  8:25 AM

## 2020-03-25 NOTE — PROGRESS NOTES
[facility] AT [address] [place] [address] 20 [address] [address], [place], [place]  Phone [phone]  Fax [phone]  Patient Name: Joie Hutson : 1975   Treatment/Medical Diagnosis: Unspecified urinary incontinence [R32]   Referral Source: Matt Blair MD     Date of Initial Visit: 2019 Attended Visits: 7 Missed Visits: 5           Thank you for referring this patient to our care for their physical therapy needs. As of today this patient has been contacted and informed that they will be temporarily placed on hold due to the nationwide concerns over COVID-19. Patient has been issued HEP and therapy staff will continue to contact pt every 1-2 weeks via phone to monitor progress. Plan to resume physical therapy once concerns improve. If you have any questions/comments please contact us directly at [phone]. Thank you for allowing us to assist in the care of your patient.     Therapist Signature: Gene Pérez PTA Date: 3/25/2020     Time: 2:17 PM

## 2020-03-26 ENCOUNTER — APPOINTMENT (OUTPATIENT)
Dept: PHYSICAL THERAPY | Age: 45
End: 2020-03-26
Payer: OTHER GOVERNMENT

## 2020-06-24 NOTE — PROGRESS NOTES
[address] [facility] AT [facility]  [address] [facility] [address] [address],[address], [place] [address]  Phone [phone]  Fax [phone]  PROGRESS NOTE  Patient Name: Akin Milton : 1975   Treatment/Medical Diagnosis: Unspecified urinary incontinence [R32]   Referral Source: Joanna Johnson MD     Date of Initial Visit: 2019 Attended Visits: 7 Missed Visits: 4 weeks due to scheduling issues and PT illness   SUMMARY OF TREATMENT  PT has consisted of pelvic floor relaxation/strengthening via biofeedback, patient education as to pelvic floor anatomy and function , urge suppression techniques and home exercise program to improve pelvic floor strength, endurance and coordination. CURRENT STATUS  Patient reports ~40% improvement in decreased frequency and amount of leakage since start of care. Patient has only received one treatment session since last reassessment due to therapist illness and scheduling conflicts. Patient demonstrates improved pelvic floor muscle strength and endurance and overall improved coordination. GOALS/MEASURE OF Progress:   1. Patient will be I in urge suppression techniques, relaxation techniques and pelvic floor strengthening exercises 2-3 times daily to demonstrate an active role in rehab process. STATUS AT REASSESSMENT: Progressing. Patient reports partial compliance. 3/17/2020  2. Patient will increase score on FOTO/urinary problem to 56 / 100 to indicate improved pelvic symptoms and quality of life. Status at reassessment: 45/100 Ongoing 3/17/2020  3. Patient will decrease resting tone in seated and hooklying positions to 5uV or less to allow for improved quality of contraction and continence. STATUS AT REASSESSMENT: 1.5uV supine and 2.5uV hooklying. Progressing 3/17/2020  4. Patient will increase net rise of slow twitch contraction to 15uV to increase continence and improve overall quality of life.  STATUS AT REASSESSMENT: 6.84uV Progressing 3/17/2020    Continue towards above goals for 4 weeks. RECOMMENDATIONS  Continue pelvic floor PT 1x  week for 4-8 treatments. If you have any questions/comments please contact us directly at [phone]. Thank you for allowing us to assist in the care of your patient. Therapist Signature: Melba Wyatt PT, DPT  Date: 3/17/2020     Time: 12:07 PM   NOTE TO PHYSICIAN:  PLEASE COMPLETE THE ORDERS BELOW AND FAX TO   [facility]: [phone]. If you are unable to process this request in 24 hours please contact our office: [phone].    ___ I have read the above report and request that my patient continue as recommended.   ___ I have read the above report and request that my patient continue therapy with the following changes/special instructions:_________________________________________________________   ___ I have read the above report and request that my patient be discharged from therapy.      Physician Signature:        Date:       Time:

## 2020-07-13 NOTE — PROGRESS NOTES
[address] [facility] AT [facility]  [address], [place], [place]  Phone [phone]  Fax  SUMMARY  Patient Name: Sadie Xie : 1975   Treatment/Medical Diagnosis: Unspecified urinary incontinence [R32]   Referral Source: Anand Allen MD     Date of Initial Visit: 2019 Attended Visits: 7 Missed Visits: 5       Summary of Care: Thank you for referring this pleasant patient. Sergio Blum has completed 7 of 20 proposed sessions. Please refer to progress note written on 3/17/2020 for details at that time. Patient did not return to physical therapy after 7th visit on 3/17- reason self -quarantine COVID 19 Pandemic. Patient declined e-visit or tele health visit. Advise patient to continue with instructed HEP. Will discharge patient at this time and if patient contact office after today will advise patient that is any additional follow up or recommendation is needed to return to referring physician. Reason for Discharge:  [] The patient was non-compliant with attendance. [] The patient could not be contacted to schedule further therapy sessions. [] The patient has been discharged based on the orders of the referring physician. [x] The patient has requested to be discharged due to: self- quarantine COVID 19 Pandemic   [] Patient has met all goals or max benefit has been achieved      If you have any questions/comments please contact us directly at [phone]. Thank you for allowing us to assist in the care of your patient.     Therapist Signature: Deacon Irvin, PT Date: 3/17/2020     Time: 8:11 PM

## 2021-09-01 ENCOUNTER — HOSPITAL ENCOUNTER (OUTPATIENT)
Dept: PHYSICAL THERAPY | Age: 46
Discharge: HOME | End: 2021-09-01
Payer: OTHER GOVERNMENT

## 2021-09-01 PROCEDURE — 97112 NEUROMUSCULAR REEDUCATION: CPT

## 2021-09-01 PROCEDURE — 97162 PT EVAL MOD COMPLEX 30 MIN: CPT

## 2021-09-01 PROCEDURE — 97140 MANUAL THERAPY 1/> REGIONS: CPT

## 2021-09-01 NOTE — PROGRESS NOTES
[address] [facility] [facility] AT [facility]  [address] [facility] [address] [address], [place], [place] [address]  Phone [phone]  Fax [phone] / 08 [facility]  Patient Name: Asif Persaud : 1975   Medical   Diagnosis: Other spondylosis, lumbar region [M47.896] Treatment Diagnosis: Low back pain   Onset Date: 2021     Referral Source: [facility]): 2021   Prior Hospitalization: See medical history Provider #: [id number]   Prior Level of Function: IND   Comorbidities: None reported    Medications: Verified on Patient Summary List   The Plan of Care and following information is based on the information from the initial evaluation.   =================================================================================  Assessment / key information:    Subjective functional status/changes:     Starting in 2021, she started getting intermittent back pain with some radicular symptoms to both LE's to the feet with changed sensation at her legs. More recently, her sensation is normal but she has increased low back pain. MRI showed decreased joint space at L5-S1 and some arthritis throughout the lumbar spine. The pt has a history of incontinence and previously underwent pelvic PT, and plans to return to getting treatment for it, but states her symptoms have not increased since being treated in . Her pain currently is 1/10 at the middle of her low back with no radicular symptoms. Her pain at worst is 6/10, and she reports her pain increases with jogging, physical lifting, prolonged standing to perform household duties, and recreational exercise. Her pain at best is 0/10 and she states her pain is relieved with 1000mg of advil, laying on her stomach, and sitting. The pt states her goals are to slow the progression of her pain. FOTO: 67/100.      Physical Therapy Evaluation - Lumbar Spine ([facility])     General Health:  Red Flags Indicated  [x]? Yes []? No   Dysfunction of bowel or bladder  []? Yes [x]? No   Numbness/tingling in buttock/genitalia region     FUNCTIONAL SQUAT: 100%  Stairs: WNL   Gait:                [x]? Normal        []? Abnormal:   SLS: L 45\", R 45\" with moderate sway and increased difficulty      Active Movements: []? N/A   []? Too acute   []? Other:  ROM % AROM   Forward flexion 40-60 100   Extension 20-30 100   SB right 20-30 100   SB left 20-30 100   Rotation right 5-10 100   Rotation left 5-10 100      Repeated Movements   Neuro Screen [x]? WNL light touch sensation LE B/L      Dural Mobility:  SLR   Supine:  [x]? R    [x]? L    []? +    [x]? -  @ (degrees):   Slump Test:     [x]? R    [x]? L    []? +    [x]? -  @ (degrees):         Strength    L(0-5) R (0-5) N/T   Hip Flexion (L1,2) 5 5 []?    Hip abduction  4+ 4+ []?    Hip extension  4 4 []?          []?    Glute max  4 4 []?    Hamstrings  4 3 []?          []?    Knee extension  5 5 []?       Special Tests     Sacroilliac:     PSIS: R TTP                          R posterior innominate rotation                             Deficits:          Hamstrings 90/90: -25 degrees B/L                                         Global Muscular Weakness:  Quadratus Lumborum: L tight      Pain Level (0-10 scale) post treatment: 1/10     ASSESSMENT/Changes in Function:   Upon evaluation, the pt presents with low back pain that limits her ability to perform jogging, physical lifting, prolonged standing to perform household duties, and recreational exercise. She presented today with decreased hip strength globally, and R posterior innominate rotation that was corrected with PA mobs to the R PSIS. SLR and slump test were both negative B/L. Light touch sensation WNL. The pt will benefit from skilled OP PT to address functional limitations listed above and to improve QoL. =================================================================================  Eval Complexity: History: MEDIUM  Complexity : 1-2 comorbidities / personal factors will impact the outcome/ POC Exam:MEDIUM Complexity : 3 Standardized tests and measures addressing body structure, function, activity limitation and / or participation in recreation  Presentation: MEDIUM Complexity : Evolving with changing characteristics  Clinical Decision Making:MEDIUM Complexity : FOTO score of 26-74Overall Complexity:MEDIUM  Problem List: pain affecting function, decrease strength, decrease ADL/ functional abilitiies and decrease flexibility/ joint mobility   Treatment Plan may include any combination of the following: Therapeutic exercise, Therapeutic activities, Neuromuscular re-education, Physical agent/modality, Gait/balance training, Manual therapy and Patient education  Patient / Family readiness to learn indicated by: interest  Persons(s) to be included in education: patient (P)  Barriers to Learning/Limitations: None  Measures taken:    Patient Goal (s): The pt reported her goal is to slow the progression of her pain    Patient self reported health status: fair  Rehabilitation Potential: good  Short term goals to be accomplished in 4 visits:   1. The pt will be IND and compliant with HEP and self management of symptoms. 2. The pt will improve R hamstring strength to 4/5 to improve her standing tolerance.      Long term goals to be accomplished in 12 visits:   1. The pt will improve B hip abduction and extension strength to 5/5 to improve her standing tolerance to perform household duties. 2. The pt will improve B hamstring and glute max strength to 5/5 to improve her ability to perform recreational exercises. 3. The pt will improve B HS length to -15 degrees to improve her ability to perform recreational exercises. 4. The pt will improve her FOTO score to 76/100 as a functional indicator of improved mobility.      Frequency / Duration:   Patient to be seen  2-3  times per week for 8-12  treatments: (All LTG as above will be assessed and updated every 10 visits or 30 days and progressed as needed)    Patient / Caregiver education and instruction: exercises  Therapist Signature: Jose Sewell PT Date: 9/9/4566   Certification Period:  Time: 11:16 AM   ===========================================================================================  I certify that the above Physical Therapy Services are being furnished while the patient is under my care. I agree with the treatment plan and certify that this therapy is necessary. Physician Signature:        Date:       Time:     Please sign and return to [facility] or you may fax the signed copy to [phone]. Thank you.   Jonathan Cantu PA

## 2021-09-01 NOTE — PROGRESS NOTES
PT DAILY TREATMENT NOTE/LUMBAR EVAL     Patient Name: Gregory Montanez  Date:2021  : 1975  [x]  Patient  Verified  Payor: BIB / Plan: [facility] 72 / Product Type:  /    In time:9:10  Out time:9:54  Total Treatment Time (min): 44  Visit #: 1 of     Treatment Area: Other spondylosis, lumbar region [M47.896]  SUBJECTIVE  Pain Level (0-10 scale): 1/10  []constant [x]intermittent []improving []worsening []no change since onset    Any medication changes, allergies to medications, adverse drug reactions, diagnosis change, or new procedure performed?: [x] No    [] Yes (see summary sheet for update)  Subjective functional status/changes:     Starting in 2021, she started getting intermittent back pain with some radicular symptoms to both LE's to the feet with changed sensation at her legs. More recently, her sensation is normal but she has increased low back pain. MRI showed decreased joint space at L5-S1 and some arthritis throughout the lumbar spine. The pt has a history of incontinence and previously underwent pelvic PT, and plans to return to getting treatment for it, but states her symptoms have not increased since being treated in . Her pain currently is 1/10 at the middle of her low back with no radicular symptoms. Her pain at worst is 6/10, and she reports her pain increases with jogging, physical lifting, prolonged standing to perform household duties, and recreational exercise. Her pain at best is 0/10 and she states her pain is relieved with 1000mg of advil, laying on her stomach, and sitting. The pt states her goals are to slow the progression of her pain. FOTO: 67/100.        OBJECTIVE/EXAMINATION    Modality rationale: PD    Min Type Additional Details    [] Estim:  []Unatt       []IFC  []Premod                        []Other:  []w/ice   []w/heat  Position:  Location:    [] Estim: []Att    []TENS instruct  []NMES                    []Other:  []w/US []w/ice   []w/heat  Position:  Location:    []  Traction: [] Cervical       []Lumbar                       [] Prone          []Supine                       []Intermittent   []Continuous Lbs:  [] before manual  [] after manual    []  Ultrasound: []Continuous   [] Pulsed                           []1MHz   []3MHz Location:  W/cm2:    []  Iontophoresis with dexamethasone         Location: [] Take home patch   [] In clinic    []  Ice     []  heat  []  Ice massage  []  Laser   []  Anodyne Position:  Location:    []  Laser with stim  []  Other: Position:  Location:    []  Vasopneumatic Device Pressure:       [] lo [] med [] hi   Temperature: [] lo [] med [] hi   [] Skin assessment post-treatment:  []intact []redness- no adverse reaction    []redness  adverse reaction:     12 min [x]Eval                  []Re-Eval         24 min Neuromuscular Re-education:  [x]  See flow sheet :  HEP development   Bridge with PPT  Supine march with PPT   Dynamic HS stretch B   Rationale: increase ROM and increase strength  to improve the patients ability to perform household duties     8 min Manual Therapy:  SIJ realignment, PA mobs to R PSIS, STM to L QL    The manual therapy interventions were performed at a separate and distinct time from the therapeutic activities interventions. Rationale: decrease pain and increase ROM to improve standing tolerance        With   [] TE   [] TA   [x] neuro   [] other: Patient Education: [x] Review HEP    [] Progressed/Changed HEP based on:   [] positioning   [] body mechanics   [] transfers   [] heat/ice application    [] other:      Other Objective/Functional Measures:     Physical Therapy Evaluation - Lumbar Spine ([facility])    General Health:  Red Flags Indicated  [x] Yes [] No Dysfunction of bowel or bladder  [] Yes [x] No Numbness/tingling in buttock/genitalia region      OBJECTIVE  FUNCTIONAL SQUAT: 100%  Stairs:  WNL   Gait:  [x] Normal     [] Abnormal:   SLS: L 45\", R 45\" with moderate sway and increased difficulty     Active Movements: [] N/A   [] Too acute   [] Other:  ROM % AROM   Forward flexion 40-60 100   Extension 20-30 100   SB right 20-30 100   SB left 20-30 100   Rotation right 5-10 100   Rotation left 5-10 100     Repeated Movements   Neuro Screen [x] WNL light touch sensation LE B/L     Dural Mobility:  SLR   Supine: [x] R    [x] L    [] +    [x] -  @ (degrees):   Slump Test: [x] R    [x] L    [] +    [x] -  @ (degrees):       Strength   L(0-5) R (0-5) N/T   Hip Flexion (L1,2) 5 5 []   Hip abduction  4+ 4+ []   Hip extension  4 4 []      []   Glute max  4 4 []   Hamstrings  4 3 []      []   Knee extension  5 5 []     Special Tests    Sacroilliac:  PSIS: R TTP    R posterior innominate rotation       Deficits: Hamstrings 90/90: -25 degrees B/L        Global Muscular Weakness:  Quadratus Lumborum: L tight     Pain Level (0-10 scale) post treatment: 1/10    ASSESSMENT/Changes in Function:   Upon evaluation, the pt presents with low back pain that limits her ability to perform jogging, physical lifting, prolonged standing to perform household duties, and recreational exercise. She presented today with decreased hip strength globally, and R posterior innominate rotation that was corrected with PA mobs to the R PSIS. SLR and slump test were both negative B/L. Light touch sensation WNL. The pt will benefit from skilled OP PT to address functional limitations listed above and to improve QoL. Patient will continue to benefit from skilled PT services to modify and progress therapeutic interventions, address functional mobility deficits, address ROM deficits, address strength deficits and analyze and modify body mechanics/ergonomics to attain remaining goals. [x]  See Plan of Care  []  See progress note/recertification  []  See Discharge Summary         Progress towards goals / Updated goals:  Short term goals to be accomplished in 4 visits:   1.  The pt will be IND and compliant with HEP and self management of symptoms. 2. The pt will improve R hamstring strength to 4/5 to improve her standing tolerance. Long term goals to be accomplished in 12 visits:   1. The pt will improve B hip abduction and extension strength to 5/5 to improve her standing tolerance to perform household duties. 2. The pt will improve B hamstring and glute max strength to 5/5 to improve her ability to perform recreational exercises. 3. The pt will improve B HS length to -15 degrees to improve her ability to perform recreational exercises. 4. The pt will improve her FOTO score to 76/100 as a functional indicator of improved mobility.      PLAN  []  Upgrade activities as tolerated     []  Continue plan of care  []  Update interventions per flow sheet       []  Discharge due to:_  [x]  Other: 2-3x/week for 8-12 visits       Justification for Eval Code Complexity:  Patient History : acute exacerbation of chronic   Examination see exam   Clinical Presentation: evolving with changing characteristics  Clinical Decision Making : FOTO : 79 /100      Valencia Farooq, PT 9/1/2021  9:04 AM

## 2021-09-03 ENCOUNTER — HOSPITAL ENCOUNTER (OUTPATIENT)
Dept: PHYSICAL THERAPY | Age: 46
Discharge: HOME | End: 2021-09-03
Payer: OTHER GOVERNMENT

## 2021-09-03 PROCEDURE — 97140 MANUAL THERAPY 1/> REGIONS: CPT

## 2021-09-03 PROCEDURE — 97110 THERAPEUTIC EXERCISES: CPT

## 2021-09-03 PROCEDURE — 97112 NEUROMUSCULAR REEDUCATION: CPT

## 2021-09-03 NOTE — PROGRESS NOTES
PT DAILY TREATMENT NOTE - [facility]     Patient Name: Misty Camejo  JKQD:[id number]  : 1975  [x]  Patient  Verified  Payor:  / Plan: [facility]  [facility] / Product Type:  /    In time:8:35  Out time:9:25  Total Treatment Time (min): 50    Visit #: 2 of     Treatment Area: Other spondylosis, lumbar region [M47.896]    SUBJECTIVE  Pain Level IN:(0-10 scale): .5/10  Pain Level OUT: (0-10 scale) post treatment: 0/10    Any medication changes, allergies to medications, adverse drug reactions, diagnosis change, or new procedure performed?: [x] No    [] Yes (see summary sheet for update)  Subjective functional status/changes:   [] No changes reported  I am doing good today maybe like . 5 when it comes to pain in my back and leg     OBJECTIVE    Modalities Rationale:     decrease edema, decrease inflammation and decrease pain to improve patient's ability to perform normal ADLS   min [] Estim, type/location:                                      []  att     []  unatt     []  w/US     []  w/ice    []  w/heat    min []  Mechanical Traction: type/lbs                   []  pro   []  sup   []  int   []  cont    []  before manual    []  after manual    min []  Ultrasound, settings/location:      min []  Iontophoresis w/ dexamethasone, location:                                               []  take home patch       []  in clinic    min []  Ice     []  Heat    location/position:     min []  Vasopneumatic Device, press/temp:     min []  Other:    [] Skin assessment post-treatment (if applicable):    []  intact    []  redness- no adverse reaction     []redness  adverse reaction:        22/17 min Therapeutic Exercise:  [x] See flow sheet : first follow up visit since initial evaluation - initiated POC per flow sheet    Rationale: increase strength and increase proprioception to improve the patients ability to achieve all goals stated below      min Therapeutic Activity:  []  See flow sheet : Rationale: increase ROM, increase strength and increase proprioception  to improve the patients ability to perform all normal ADL without increase pain      20 min Neuromuscular Re-education:  [x]  See flow sheet :  3 way hip  SLS on foam  PPT with marching in supine  Forward walk aways with green t-band   Rationale: increase ROM and increase strength  to improve the patients ability to achieve goals below     8 min Manual Therapy: check SI alignment and presents with (R) posterior and (L) anterior rotation - attempted MET to correct however responded better with PVC tube to correct     Rationale: decrease pain, increase ROM, increase tissue extensibility, decrease trigger points and increase postural awareness to lumbar sacral area         With   [] TE   [] TA   [] neuro   [] other: Patient Education: [x] Review HEP    [] Progressed/Changed HEP based on:   [] positioning   [] body mechanics   [] transfers   [] heat/ice application    [] other:      Objective/Functional Measures with Therapist Assessment Noted with Response to Therapy Session:   first follow up visit since initial evaluation    initiated POC per flow sheet   Patient presented with SI dysfunction- (R) posterior and (L) anterior rotation - attempted MET to correct however responded better with PVC tube to correct    Patient was able to perform correct PPT with VC'ing  Noted weakness to (R) leg (glut/hip adductor)  causing a lateral lean with performing SLS on foam and 3 way hip with weight bearing into the (R) leg   Gave handout for patient to self correct SI dysfunction     ASSESSMENT/Changes in Function:     Patient will continue to benefit from skilled PT services to modify and progress therapeutic interventions, address functional mobility deficits, address ROM deficits, address strength deficits, analyze and address soft tissue restrictions, analyze and modify body mechanics/ergonomics, assess and modify postural abnormalities and instruct in home and community integration to attain remaining goals. [x]  See Plan of Care  []  See progress note/recertification  []  See Discharge Summary         Progress towards goals / Updated goals:  Short term goals to be accomplished in 4 visits:   1. The pt will be IND and compliant with HEP and self management of symptoms. 2. The pt will improve R hamstring strength to 4/5 to improve her standing tolerance.      Long term goals to be accomplished in 12 visits:   1. The pt will improve B hip abduction and extension strength to 5/5 to improve her standing tolerance to perform household duties. 2. The pt will improve B hamstring and glute max strength to 5/5 to improve her ability to perform recreational exercises. 3. The pt will improve B HS length to -15 degrees to improve her ability to perform recreational exercises. 4. The pt will improve her FOTO score to 76/100 as a functional indicator of improved mobility.      PLAN  [x]  Upgrade activities as tolerated     [x]  Continue plan of care  []  Update interventions per flow sheet       []  Discharge due to:_  []  Other:_      Tama Rinne, KELI 9/3/2021  9:07 AM    Future Appointments   Date Time Provider Dustin Reyes   9/8/2021  2:00 PM [facility] [place] [facility]   9/10/2021  2:00 PM Hipolito Conner, PT [facility]   9/14/2021  2:45 PM Hipolito Conner, PT [facility]   9/17/2021  2:00 PM Hipolito Conner PT [facility]   9/21/2021  2:00 PM Zaida Rivera, PT [facility]   9/24/2021  2:00 PM Hipolito Conner, PT [facility]   9/28/2021  2:00 PM Hipolito Conner, PT [facility]   10/1/2021  2:00 PM Hipolito Conner PT [facility]

## 2021-09-08 ENCOUNTER — HOSPITAL ENCOUNTER (OUTPATIENT)
Dept: PHYSICAL THERAPY | Age: 46
Discharge: HOME | End: 2021-09-08
Payer: OTHER GOVERNMENT

## 2021-09-08 PROCEDURE — 97112 NEUROMUSCULAR REEDUCATION: CPT

## 2021-09-08 PROCEDURE — 97110 THERAPEUTIC EXERCISES: CPT

## 2021-09-08 NOTE — PROGRESS NOTES
PT DAILY TREATMENT NOTE - [facility]     Patient Name: Mega Garner  Date:2021  : 1975  [x]  Patient  Verified  Payor:  / Plan: [facility] 72 / Product Type:  /    In time: 2:05  Out time: 2:45  Total Treatment Time (min): 40    Visit #: 3 of     Treatment Area: Other spondylosis, lumbar region [M47.896]    SUBJECTIVE  Pain Level IN:(0-10 scale): 1/10  Pain Level OUT: (0-10 scale) post treatment: 1/10    Any medication changes, allergies to medications, adverse drug reactions, diagnosis change, or new procedure performed?: [x] No    [] Yes (see summary sheet for update)  Subjective functional status/changes:   [] No changes reported  After the adjustment last time the pain switched to the other side but only lasted an hour.      OBJECTIVE    Modalities Rationale:     decrease edema, decrease inflammation and decrease pain to improve patient's ability to perform normal ADLS   min [] Estim, type/location:                                      []  att     []  unatt     []  w/US     []  w/ice    []  w/heat    min []  Mechanical Traction: type/lbs                   []  pro   []  sup   []  int   []  cont    []  before manual    []  after manual    min []  Ultrasound, settings/location:      min []  Iontophoresis w/ dexamethasone, location:                                               []  take home patch       []  in clinic    min []  Ice     []  Heat    location/position:     min []  Vasopneumatic Device, press/temp:     min []  Other:    [] Skin assessment post-treatment (if applicable):    []  intact    []  redness- no adverse reaction     []redness  adverse reaction:        23 min Therapeutic Exercise:  [x] See flow sheet :    Rationale: increase strength and increase proprioception to improve the patients ability to achieve all goals stated below      min Therapeutic Activity:  []  See flow sheet :   Rationale: increase ROM, increase strength and increase proprioception to improve the patients ability to perform all normal ADL without increase pain      20 min Neuromuscular Re-education:  [x]  See flow sheet :  3 way hip  SLS on foam  PPT with marching in supine  Forward walk aways with green t-band   Rationale: increase ROM and increase strength  to improve the patients ability to achieve goals below     2 min Manual Therapy: check SI alignment and presents with (R) posterior and (L) anterior rotation - attempted MET to correct however responded better with PVC tube to correct     Rationale: decrease pain, increase ROM, increase tissue extensibility, decrease trigger points and increase postural awareness to lumbar sacral area         With   [] TE   [] TA   [] neuro   [] other: Patient Education: [x] Review HEP    [] Progressed/Changed HEP based on:   [] positioning   [] body mechanics   [] transfers   [] heat/ice application    [] other:      Objective/Functional Measures:  - SI Check: level  - added SLR x 3, deadbugs LV 1, prone donkey kick, prone HS curl    ASSESSMENT/Changes in Function:   Patient tolerated session well with addition of new therex, no increase in pain. No pelvic asymmetry noted today and patient declined modalities. Patient will continue to benefit from skilled PT services to modify and progress therapeutic interventions, address functional mobility deficits, address ROM deficits, address strength deficits, analyze and address soft tissue restrictions, analyze and modify body mechanics/ergonomics, assess and modify postural abnormalities and instruct in home and community integration to attain remaining goals. [x]  See Plan of Care  []  See progress note/recertification  []  See Discharge Summary         Progress towards goals / Updated goals:  Short term goals to be accomplished in 4 visits:   1. The pt will be IND and compliant with HEP and self management of symptoms. Current: pt reports compliance. 9/8/21   2.  The pt will improve R hamstring strength to 4/5 to improve her standing tolerance. Current:       Long term goals to be accomplished in 12 visits:   1. The pt will improve B hip abduction and extension strength to 5/5 to improve her standing tolerance to perform household duties. 2. The pt will improve B hamstring and glute max strength to 5/5 to improve her ability to perform recreational exercises. 3. The pt will improve B HS length to -15 degrees to improve her ability to perform recreational exercises. 4. The pt will improve her FOTO score to 76/100 as a functional indicator of improved mobility.      PLAN  [x]  Upgrade activities as tolerated     [x]  Continue plan of care  []  Update interventions per flow sheet       []  Discharge due to:_  []  Other:_      AKIL Martinez 9/8/2021  1:45 PM    Future Appointments   Date Time Provider Dustin Reyes   9/8/2021  2:00 PM [facility] [place] [facility]   9/10/2021  2:00 PM Shawna Pap, PT [facility]   9/14/2021  2:45 PM Shawna Pap, PT [facility]   9/17/2021  2:00 PM Shawna Pap, PT [facility]   9/21/2021  2:00 PM Eliane Rivas, PT [facility]   9/24/2021  2:00 PM Shawna Pap, PT [facility]   9/28/2021  2:00 PM Shawna Pap, PT [facility]   10/1/2021  2:00 PM Shawna Pap, PT [facility]

## 2021-09-10 ENCOUNTER — HOSPITAL ENCOUNTER (OUTPATIENT)
Dept: PHYSICAL THERAPY | Age: 46
Discharge: HOME | End: 2021-09-10
Payer: OTHER GOVERNMENT

## 2021-09-10 PROCEDURE — 97530 THERAPEUTIC ACTIVITIES: CPT

## 2021-09-10 PROCEDURE — 97110 THERAPEUTIC EXERCISES: CPT

## 2021-09-10 PROCEDURE — 97112 NEUROMUSCULAR REEDUCATION: CPT

## 2021-09-10 PROCEDURE — 97140 MANUAL THERAPY 1/> REGIONS: CPT

## 2021-09-10 NOTE — PROGRESS NOTES
PT DAILY TREATMENT NOTE - [facility]     Patient Name: Yevgeniy Rush  Date:9/10/2021  : 1975  [x]  Patient  Verified  Payor:  [facility] / Product Type:  /    In time: 2:03  Out time: 2:49   Total Treatment Time (min): 46     Visit #: 4 of     Treatment Area: Other spondylosis, lumbar region [M47.896]    SUBJECTIVE  Pain Level IN:(0-10 scale): 1/10    Any medication changes, allergies to medications, adverse drug reactions, diagnosis change, or new procedure performed?: [x] No    [] Yes (see summary sheet for update)  Subjective functional status/changes:   [] No changes reported  She reports having more pain on the R side than the left. No radicular symptoms today. No soreness after last visit.      OBJECTIVE    Modalities Rationale:     decrease edema, decrease inflammation and decrease pain to improve patient's ability to perform normal ADLS   min [] Estim, type/location:                                      []  att     []  unatt     []  w/US     []  w/ice    []  w/heat    min []  Mechanical Traction: type/lbs                   []  pro   []  sup   []  int   []  cont    []  before manual    []  after manual    min []  Ultrasound, settings/location:      min []  Iontophoresis w/ dexamethasone, location:                                               []  take home patch       []  in clinic   PD min []  Ice     []  Heat    location/position:     min []  Vasopneumatic Device, press/temp:     min []  Other:    [] Skin assessment post-treatment (if applicable):    []  intact    []  redness- no adverse reaction     []redness  adverse reaction:       13  min Therapeutic Exercise:  [x] See flow sheet :   TM   Incline stretch  HS stretch at stair   HS curl prone  Prone donkey kick   Hip 3 way  Clamshell I/II    Rationale: increase strength and increase proprioception to improve the patients ability to achieve all goals stated below     10  min Therapeutic Activity:  [x] See flow sheet :  TB walk x 3  SLR x 3  Wall sits    Rationale: increase ROM, increase strength and increase proprioception  to improve the patients ability to perform all normal ADL without increase pain      15  min Neuromuscular Re-education:  [x]  See flow sheet :  SLS on foam  PPT with marching in supine  Forward walk aways with green t-band  Bridge with PPT   Dynamic HS stretch  Dead bugs    Rationale: increase ROM and increase strength  to improve the patients ability to achieve goals below     8 min Manual Therapy: check SI alignment and presents with (L) posterior and (R) anterior rotation - PA mobs to L PSIS, STM to L QL    Rationale: decrease pain, increase ROM, increase tissue extensibility, decrease trigger points and increase postural awareness to lumbar sacral area         With   [] TE   [] TA   [] neuro   [] other: Patient Education: [x] Review HEP    [] Progressed/Changed HEP based on:   [] positioning   [] body mechanics   [] transfers   [] heat/ice application    [] other:      Objective/Functional Measures:  Required intermittent UE support during SLS B/L. L posterior innominate rotation     Pain Level OUT: (0-10 scale) post treatment: 0 /10      ASSESSMENT/Changes in Function:   The pt had cramping in HS during prone donkey kicks B/L and required multiple rest breaks. She presented today with L posterior innominate rotation which was corrected with PA mobs to L PSIS. The pt continued to require intermittent UE support during SLS on foam. Plan to assess goals nv. Patient will continue to benefit from skilled PT services to modify and progress therapeutic interventions, address functional mobility deficits, address ROM deficits, address strength deficits, analyze and address soft tissue restrictions, analyze and modify body mechanics/ergonomics, assess and modify postural abnormalities and instruct in home and community integration to attain remaining goals.      [x]  See Plan of Care  [] See progress note/recertification  []  See Discharge Summary         Progress towards goals / Updated goals:  Short term goals to be accomplished in 4 visits:   1. The pt will be IND and compliant with HEP and self management of symptoms. Current: pt reports compliance. 9/8/21   2. The pt will improve R hamstring strength to 4/5 to improve her standing tolerance. Current:       Long term goals to be accomplished in 12 visits:   1. The pt will improve B hip abduction and extension strength to 5/5 to improve her standing tolerance to perform household duties. 2. The pt will improve B hamstring and glute max strength to 5/5 to improve her ability to perform recreational exercises. 3. The pt will improve B HS length to -15 degrees to improve her ability to perform recreational exercises. 4. The pt will improve her FOTO score to 76/100 as a functional indicator of improved mobility.      PLAN  [x]  Upgrade activities as tolerated     [x]  Continue plan of care  []  Update interventions per flow sheet       []  Discharge due to:_  [x]  Other: check goals nv    Lisa Pedroza, PT 9/10/2021  1:45 PM    Future Appointments   Date Time Provider Dustin Reyes   9/14/2021  2:45 PM Norman Urbano, PT [facility]   9/17/2021  2:00 PM Norman Urbano, PT [facility]   9/21/2021  2:00 PM Kory Owens, PT [facility]   9/24/2021  2:00 PM Norman Urbano PT [facility]   9/28/2021  2:00 PM Norman Urbano, PT [facility]   10/1/2021  2:00 PM [facility] PT [facility]

## 2021-09-14 ENCOUNTER — HOSPITAL ENCOUNTER (OUTPATIENT)
Dept: PHYSICAL THERAPY | Age: 46
Discharge: HOME | End: 2021-09-14
Payer: OTHER GOVERNMENT

## 2021-09-14 PROCEDURE — 97110 THERAPEUTIC EXERCISES: CPT | Performed by: PHYSICAL THERAPIST

## 2021-09-14 PROCEDURE — 97112 NEUROMUSCULAR REEDUCATION: CPT | Performed by: PHYSICAL THERAPIST

## 2021-09-14 PROCEDURE — 97530 THERAPEUTIC ACTIVITIES: CPT | Performed by: PHYSICAL THERAPIST

## 2021-09-14 NOTE — PROGRESS NOTES
PT DAILY TREATMENT NOTE - [facility]     Patient Name: Nik Rivera  Date:2021  : 1975  [x]  Patient  Verified  Payor: BIB / Plan: Fady Monte 72 / Product Type:  /    In time: 253  Out time: 344pm  Total Treatment Time (min): 51min    Visit #: 5 of     Treatment Area:  Other spondylosis, lumbar region [M47.896]    SUBJECTIVE  Pain Level IN:(0-10 scale): 1/10    Any medication changes, allergies to medications, adverse drug reactions, diagnosis change, or new procedure performed?: [x] No    [] Yes (see summary sheet for update)  Subjective functional status/changes:   [x] No changes reported    OBJECTIVE    Modalities Rationale:     decrease edema, decrease inflammation and decrease pain to improve patient's ability to perform normal ADLS   min [] Estim, type/location:                                      []  att     []  unatt     []  w/US     []  w/ice    []  w/heat    min []  Mechanical Traction: type/lbs                   []  pro   []  sup   []  int   []  cont    []  before manual    []  after manual    min []  Ultrasound, settings/location:      min []  Iontophoresis w/ dexamethasone, location:                                               []  take home patch       []  in clinic   PD min []  Ice     []  Heat    location/position:     min []  Vasopneumatic Device, press/temp:     min []  Other:    [] Skin assessment post-treatment (if applicable):    []  intact    []  redness- no adverse reaction     []redness  adverse reaction:       13  min Therapeutic Exercise:  [x] See flow sheet :   TM   Incline stretch  HS stretch at stair   HS curl prone: TC  Prone donkey kick   Hip 3 way  Clamshell I/II    Rationale: increase strength and increase proprioception to improve the patients ability to achieve all goals stated below     15 min Therapeutic Activity:  [x]  See flow sheet :  TB walk x 3  SLR x 3  Wall sits/ mini squat    Rationale: increase ROM, increase strength and increase proprioception  to improve the patients ability to perform all normal ADL without increase pain      15  min Neuromuscular Re-education:  [x]  See flow sheet :  SLS on foam  PPT with marching in supine  Forward walk aways with green t-band  Bridge with PPT   Dynamic HS stretch  Dead bugs    Rationale: increase ROM and increase strength  to improve the patients ability to achieve goals below     8 min Manual Therapy: check SI alignment and presents with (L) posterior and (R) anterior rotation - PA mobs to L PSIS, STM to L QL    Rationale: decrease pain, increase ROM, increase tissue extensibility, decrease trigger points and increase postural awareness to lumbar sacral area         With   [] TE   [] TA   [] neuro   [] other: Patient Education: [x] Review HEP    [] Progressed/Changed HEP based on:   [] positioning   [] body mechanics   [] transfers   [] heat/ice application    [] other:      Objective/Functional Measures:  Pelvic alignment : +long sit test, METs , L posterior innominate rotation noted  R HS strength: modified donkey kicks to q-ped with no cramping noted  Added dead bug from table top position    Pain Level OUT: (0-10 scale) post treatment: 0 /10    ASSESSMENT/Changes in Function:   Pt reports LE tingling/numbness better but continues in plantar aspect of foot and now radiating up into chest at times. Gave pt HEP of METs for pelvic mal-alignment due to persistent rotation noted. Pt presents with low levels of pain but reports neurological sx into the chest and under arm pits. Improved tolerance to post chain strengthening today. Assess HS strength NV.    Patient will continue to benefit from skilled PT services to modify and progress therapeutic interventions, address functional mobility deficits, address ROM deficits, address strength deficits, analyze and address soft tissue restrictions, analyze and modify body mechanics/ergonomics, assess and modify postural abnormalities and instruct in home and community integration to attain remaining goals. [x]  See Plan of Care  []  See progress note/recertification  []  See Discharge Summary         Progress towards goals / Updated goals:  Short term goals to be accomplished in 4 visits:   1. The pt will be IND and compliant with HEP and self management of symptoms. Current: pt reports compliance. 9/8/21   2. The pt will improve R hamstring strength to 4/5 to improve her standing tolerance. Current:  Assess NV, improved tolerance today without cramping. 9/14/21     Long term goals to be accomplished in 12 visits:   1. The pt will improve B hip abduction and extension strength to 5/5 to improve her standing tolerance to perform household duties. 2. The pt will improve B hamstring and glute max strength to 5/5 to improve her ability to perform recreational exercises. 3. The pt will improve B HS length to -15 degrees to improve her ability to perform recreational exercises. 4. The pt will improve her FOTO score to 76/100 as a functional indicator of improved mobility.      PLAN  [x]  Upgrade activities as tolerated     [x]  Continue plan of care  []  Update interventions per flow sheet       []  Discharge due to:_  []  Other:     Aaron Pollard, PT 9/14/2021  1:45 PM    Future Appointments   Date Time Provider Dustin Reyes   9/14/2021  2:45 PM Sree Adams, PT [place] [id number]   9/17/2021  2:00 PM Demond Gomes, PT [facility] [address]   9/21/2021  2:00 PM Sree Adams PT [facility] [address]   9/24/2021  2:00 PM Demond Gomes, PT [facility] [address]   9/28/2021  2:00 PM Demond Goems, PT [facility] [address]   10/1/2021  2:00 PM Yoni6 Pina Jaquez PT [address] [facility] [address]

## 2021-09-17 ENCOUNTER — APPOINTMENT (OUTPATIENT)
Dept: PHYSICAL THERAPY | Age: 46
End: 2021-09-17
Payer: OTHER GOVERNMENT

## 2021-09-21 ENCOUNTER — HOSPITAL ENCOUNTER (OUTPATIENT)
Dept: PHYSICAL THERAPY | Age: 46
Discharge: HOME | End: 2021-09-21
Payer: OTHER GOVERNMENT

## 2021-09-21 PROCEDURE — 97530 THERAPEUTIC ACTIVITIES: CPT | Performed by: PHYSICAL THERAPIST

## 2021-09-21 PROCEDURE — 97110 THERAPEUTIC EXERCISES: CPT | Performed by: PHYSICAL THERAPIST

## 2021-09-21 PROCEDURE — 97112 NEUROMUSCULAR REEDUCATION: CPT | Performed by: PHYSICAL THERAPIST

## 2021-09-24 ENCOUNTER — APPOINTMENT (OUTPATIENT)
Dept: PHYSICAL THERAPY | Age: 46
End: 2021-09-24
Payer: OTHER GOVERNMENT

## 2021-09-28 ENCOUNTER — HOSPITAL ENCOUNTER (OUTPATIENT)
Dept: PHYSICAL THERAPY | Age: 46
Discharge: HOME | End: 2021-09-28
Payer: OTHER GOVERNMENT

## 2021-09-28 PROCEDURE — 97110 THERAPEUTIC EXERCISES: CPT

## 2021-09-28 PROCEDURE — 97530 THERAPEUTIC ACTIVITIES: CPT

## 2021-09-28 PROCEDURE — 97112 NEUROMUSCULAR REEDUCATION: CPT

## 2021-09-28 PROCEDURE — 97140 MANUAL THERAPY 1/> REGIONS: CPT

## 2021-09-28 NOTE — PROGRESS NOTES
PT DAILY TREATMENT NOTE - [facility]     Patient Name: Natalie Cruz  Date:2021  : 1975  [x]  Patient  Verified  Payor:  / Plan: Akila Peck / Product Type:  /    In time: 201pm  Out time: 2:51 pm  Total Treatment Time (min): 51 min    Visit #: 7     Treatment Area: Other spondylosis, lumbar region [M47.896]    SUBJECTIVE  Pain Level IN:(0-10 scale): 0/10     Any medication changes, allergies to medications, adverse drug reactions, diagnosis change, or new procedure performed?: [x] No    [] Yes (see summary sheet for update)  Subjective functional status/changes:   [x] No changes reported  She reports no radicular symptoms today, no soreness after last visit. States she feels that PT is helping.      OBJECTIVE    Modalities Rationale:     decrease edema, decrease inflammation and decrease pain to improve patient's ability to perform normal ADLS   min [] Estim, type/location:                                      []  att     []  unatt     []  w/US     []  w/ice    []  w/heat    min []  Mechanical Traction: type/lbs                   []  pro   []  sup   []  int   []  cont    []  before manual    []  after manual    min []  Ultrasound, settings/location:      min []  Iontophoresis w/ dexamethasone, location:                                               []  take home patch       []  in clinic   PD min []  Ice     []  Heat    location/position:     min []  Vasopneumatic Device, press/temp:     min []  Other:    [] Skin assessment post-treatment (if applicable):    []  intact    []  redness- no adverse reaction     []redness  adverse reaction:       10  min Therapeutic Exercise:  [x] See flow sheet :   TM   Incline stretch  HS stretch at stair    HS curl prone, seated   Prone donkey kick   Hip 3 way- TC  Clamshell I/II - TC   Rationale: increase strength and increase proprioception to improve the patients ability to achieve all goals stated below     10  min Therapeutic Activity:  [x]  See flow sheet :  TB walk x 3  SLR x 3   Wall sits/ mini squat   Reverse lunges    Rationale: increase ROM, increase strength and increase proprioception  to improve the patients ability to perform all normal ADL without increase pain      23  min Neuromuscular Re-education:  [x]  See flow sheet :  SLS on foam  March on foam  PPT with marching in supine  Lateral walk aways with green t-band   Bridge with PPT   Dynamic HS stretch   Dead bugs    Rationale: increase ROM and increase strength  to improve the patients ability to achieve goals below     8  min Manual Therapy: check SI alignment and presents with (L) posterior and (R) anterior rotation - PA mobs to L PSIS, STM to L QL    Rationale: decrease pain, increase ROM, increase tissue extensibility, decrease trigger points and increase postural awareness to lumbar sacral area         With   [] TE   [] TA   [] neuro   [] other: Patient Education: [x] Review HEP    [] Progressed/Changed HEP based on:   [] positioning   [] body mechanics   [] transfers   [] heat/ice application    [] other:      Objective/Functional Measures:  HS length: R -10 deg L -10 deg    Increased repetitions of mini squats   Introduced reverse lunges   L posterior innominate rotation     Pain Level OUT: (0-10 scale) post treatment: 0  /10    ASSESSMENT/Changes in Function:  The pt was challenged by posterior chain strengthening today, however performed all without pain increase. Noted increased difficulty during seated HS curls on the R>L. She presented with L posterior innominate rotation today, corrected with PA mobs to L PSIS. The pt demonstrated improved B/L HS length since IE. Plan to reassess nv.       Patient will continue to benefit from skilled PT services to modify and progress therapeutic interventions, address functional mobility deficits, address ROM deficits, address strength deficits, analyze and address soft tissue restrictions, analyze and modify body mechanics/ergonomics, assess and modify postural abnormalities and instruct in home and community integration to attain remaining goals. [x]  See Plan of Care  []  See progress note/recertification  []  See Discharge Summary         Progress towards goals / Updated goals:  Short term goals to be accomplished in 4 visits:   1. The pt will be IND and compliant with HEP and self management of symptoms. Current: pt reports compliance. 9/8/21   2. The pt will improve R hamstring strength to 4/5 to improve her standing tolerance. Current:  Assess NV, improved tolerance today without cramping. 9/14/21 4-, ongoing 9/21/21     Long term goals to be accomplished in 12 visits:   1. The pt will improve B hip abduction and extension strength to 5/5 to improve her standing tolerance to perform household duties. 2. The pt will improve B hamstring and glute max strength to 5/5 to improve her ability to perform recreational exercises. 3. The pt will improve B HS length to -15 degrees to improve her ability to perform recreational exercises. MET -10 deg 09/28/21   4. The pt will improve her FOTO score to 76/100 as a functional indicator of improved mobility.      PLAN  [x]  Upgrade activities as tolerated     [x]  Continue plan of care  []  Update interventions per flow sheet       []  Discharge due to:_  [x]  Other:  Plan to reassess Mariola Rm, PT 9/28/2021  1:45 PM    Future Appointments   Date Time Provider Dustin Reyes   10/1/2021  2:00 PM [address] [place] [address] Pina Jaquez

## 2021-10-01 ENCOUNTER — HOSPITAL ENCOUNTER (OUTPATIENT)
Dept: PHYSICAL THERAPY | Age: 46
Discharge: HOME | End: 2021-10-01
Payer: OTHER GOVERNMENT

## 2021-10-01 PROCEDURE — 97112 NEUROMUSCULAR REEDUCATION: CPT

## 2021-10-01 PROCEDURE — 97110 THERAPEUTIC EXERCISES: CPT

## 2021-10-01 PROCEDURE — 97140 MANUAL THERAPY 1/> REGIONS: CPT

## 2021-10-01 PROCEDURE — 97530 THERAPEUTIC ACTIVITIES: CPT

## 2021-10-01 NOTE — PROGRESS NOTES
PT DAILY TREATMENT NOTE - [facility]     Patient Name: Jude Coronado  Date:10/1/2021  : 1975  [x]  Patient  Verified  Payor:  / Plan: [facility]  [facility] REGION / Product Type:  /    In time: 1:50  Out time: 2:40    Total Treatment Time (min): 50      Visit #: 8 of     Treatment Area: Other spondylosis, lumbar region [M47.896]    SUBJECTIVE  Pain Level IN:(0-10 scale): .5  /10     Any medication changes, allergies to medications, adverse drug reactions, diagnosis change, or new procedure performed?: [x] No    [] Yes (see summary sheet for update)  Subjective functional status/changes:   [x] No changes reported  20% overall improvement since starting therapy. Less frequent/intense radicular sx. Improved sitting tolerance.     OBJECTIVE    Modalities Rationale:     decrease edema, decrease inflammation and decrease pain to improve patient's ability to perform normal ADLS   min [] Estim, type/location:                                      []  att     []  unatt     []  w/US     []  w/ice    []  w/heat    min []  Mechanical Traction: type/lbs                   []  pro   []  sup   []  int   []  cont    []  before manual    []  after manual    min []  Ultrasound, settings/location:      min []  Iontophoresis w/ dexamethasone, location:                                               []  take home patch       []  in clinic   PD min []  Ice     []  Heat    location/position:     min []  Vasopneumatic Device, press/temp:     min []  Other:    [] Skin assessment post-treatment (if applicable):    []  intact    []  redness- no adverse reaction     []redness  adverse reaction:       20  min Therapeutic Exercise:  [x] See flow sheet :    Rationale: increase strength and increase proprioception to improve the patients ability to achieve all goals stated below     10 min Therapeutic Activity:  [x]  See flow sheet :   Rationale: increase ROM, increase strength and increase proprioception  to improve the patients ability to perform all normal ADL without increase pain      10  min Neuromuscular Re-education:  [x]  See flow sheet :   Rationale: increase ROM and increase strength  to improve the patients ability to achieve goals below     10 min Manual Therapy: check SI alignment and presents with (L) posterior and (R) anterior rotation - PA mobs to L PSIS, STM to L QL    Rationale: decrease pain, increase ROM, increase tissue extensibility, decrease trigger points and increase postural awareness to lumbar sacral area         With   [x] TE   [] TA   [] neuro   [] other: Patient Education: [x] Review HEP    [] Progressed/Changed HEP based on:   [] positioning   [] body mechanics   [] transfers   [] heat/ice application    [] other:      Objective/Functional Measures:  See PN. Pain Level OUT: (0-10 scale) post treatment: 0  /10    ASSESSMENT/Changes in Function: See PN. Patient will continue to benefit from skilled PT services to modify and progress therapeutic interventions, address functional mobility deficits, address ROM deficits, address strength deficits, analyze and address soft tissue restrictions, analyze and modify body mechanics/ergonomics, assess and modify postural abnormalities and instruct in home and community integration to attain remaining goals. []  See Plan of Care  [x]  See progress note/recertification  []  See Discharge Summary         Progress towards goals / Updated goals:  Short term goals to be accomplished in 4 visits:   1. The pt will be IND and compliant with HEP and self management of symptoms. Current: pt reports compliance. 9/8/21   2. The pt will improve R hamstring strength to 4/5 to improve her standing tolerance. Current:  4+ Bilaterally, 10/1/21     Long term goals to be accomplished in 12 visits:   1. The pt will improve B hip abduction and extension strength to 5/5 to improve her standing tolerance to perform household duties.    Progressing 4+/5 Bilaterally  2. The pt will improve B hamstring and glute max strength to 5/5 to improve her ability to perform recreational exercises. Progressing 10/1/21: 4+5 Bilaterally  3. The pt will improve B HS length to -15 degrees to improve her ability to perform recreational exercises. MET -10 deg 09/28/21   4. The pt will improve her FOTO score to 76/100 as a functional indicator of improved mobility. Not met; 67/100 10/1/21    PLAN  [x]  Upgrade activities as tolerated     [x]  Continue plan of care  []  Update interventions per flow sheet       []  Discharge due to:_  []  Other:  Cont PT 1-2x per week for 4 weeks to address aforementioned functional limitations.      Bob Robbins, PTA 10/1/2021  1:45 PM    Future Appointments   Date Time Provider Dustin Reyes   10/1/2021  1:45 PM [facility]

## 2021-10-01 NOTE — PROGRESS NOTES
[address] [facility] AT [facility]  [address] [facility] [address] [place] [address],[address], [place]andes  Phone [phone]  Fax [phone]  PROGRESS NOTE  Patient Name: Rafat Aldrich : 1975   Treatment/Medical Diagnosis: Other spondylosis, lumbar region [M47.896]   Referral Source: SAUL Bangura     Date of Initial Visit: 21 Attended Visits: 8 Missed Visits: 2     SUMMARY OF TREATMENT  Treatment has consisted of manual therapy, therapeutic exercise and activity, neuromuscular re-education, modalities as needed for pain control, and instruction and progression of a home exercise program.     CURRENT STATUS  Patient reports approximately 20% overall improvement since initial evaluation with pain levels rated 0/10 @ best,  .5/10 pain level on average, increased to 4-5/10 @ worst. Pt notes difficulty sleeping 3 out of 7 nights. Pt presents with centralization of sx as reported less frequent (though daily) and intense B radicular sx with slight increase in LBP. Increased pain with running and prolonged walking, > 1 mile. Pt reports now being able to sit for as long as she likes, wherein she could not sit at all prior to PT. Cont to present with L post innom which is corrected with MET. Pt would benefit from cont PT to decrease LBP, centralize radicular sx, and improve core strengthening to allow her to maintain pelvic alignment and perform ADLs with more ease. ROM: Flex/Ext/SB/Rot: 100%  Functional Squat: 100%  SLS: 45 seconds plus bilaterally. Goal/Measure of Progress Goal Met? 1. The pt will be IND and compliant with HEP and self management of symptoms. Status at last Eval: Established Current Status: Yes yes   2. The pt will improve R hamstring strength to 4/5 to improve her standing tolerance. Status at last Eval: L: 4/5  R: 3/5 Current Status: B: 4+/5 yes   3. The pt will improve her FOTO score to 76/100 as a functional indicator of improved mobility. Status at last Eval: 67 Current Status: 63 no   4. The pt will improve B HS length to -15 degrees to improve her ability to perform recreational exercises. Status at last Eval: -25 deg B Current Status: -10 deg B yes     New Goals to be achieved in __4__  weeks:  1. The pt will demonstrate pelvic alignment in 3 consecutive visits. 2.  The pt will improve B HS length to -5 degrees to improve her ability to perform recreational exercises. 3.  The pt will improve her FOTO score to 76/100 as a functional indicator of improved mobility. 4.  The pt will report improved sleep > 4 nights per week. RECOMMENDATIONS  Cont PT 1- 2x per week for 4 weeks to address aforementioned functional limitations. If you have any questions/comments please contact us directly at [phone]. Thank you for allowing us to assist in the care of your patient. LPTA Signature: Jeremy Mccann PTA  Date: 10/1/2021   PT Signature: Constance Babb PT, DPT  Time: 12:10 PM   NOTE TO PHYSICIAN:  PLEASE COMPLETE THE ORDERS BELOW AND FAX TO   [facility] [place]: [phone]. If you are unable to process this request in 24 hours please contact our office: [phone].    ___ I have read the above report and request that my patient continue as recommended.   ___ I have read the above report and request that my patient continue therapy with the following changes/special instructions:_________________________________________________________   ___ I have read the above report and request that my patient be discharged from therapy.      Physician Signature:        Date:       Time:    SAUL Lucia

## 2021-10-06 ENCOUNTER — APPOINTMENT (OUTPATIENT)
Dept: PHYSICAL THERAPY | Age: 46
End: 2021-10-06
Payer: OTHER GOVERNMENT

## 2021-10-13 ENCOUNTER — APPOINTMENT (OUTPATIENT)
Dept: PHYSICAL THERAPY | Age: 46
End: 2021-10-13
Payer: OTHER GOVERNMENT

## 2021-10-20 ENCOUNTER — APPOINTMENT (OUTPATIENT)
Dept: PHYSICAL THERAPY | Age: 46
End: 2021-10-20
Payer: OTHER GOVERNMENT

## 2021-10-27 ENCOUNTER — APPOINTMENT (OUTPATIENT)
Dept: PHYSICAL THERAPY | Age: 46
End: 2021-10-27
Payer: OTHER GOVERNMENT

## 2021-11-02 NOTE — PROGRESS NOTES
[address] [facility] AT [facility]  [address] [facility] [address] [place] [address],[address], [facility], [place] Tena  Phone [phone]  Fax  SUMMARY  Patient Name: Odilia Mack : 1975   Treatment/Medical Diagnosis: Other spondylosis, lumbar region [M47.896]   Referral Source: SAUL Sharif     Date of Initial Visit: 2021 Attended Visits: 8 Missed Visits: 4     SUMMARY OF TREATMENT  Thank you for this referral. The pt has been seen for 8 visits to address low back pain. Therapeutic interventions have included therapeutic exercise, therapeutic activity, neuromuscular re-education, manual treatment, modalities and patient education. CURRENT STATUS  Unable to formally assess goals for discharge as the patient did not return after visit 8. Please refer to progress report on 10/01/2021 for status of patient at last assessment. The pt did not contact the clinic for further need for physical therapy. The pt will be discharged at this time, and if the patient contacts the office after today, she will be advised that if any additional follow up or recommendation is needed that she will need to return to the referring physician. RECOMMENDATIONS  Discontinue therapy due to lack of attendance or compliance. If you have any questions/comments please contact us directly at [phone]. Thank you for allowing us to assist in the care of your patient.     Therapist Signature: Reymundo Martinez PT Date: 2021     Time: 4:43 PM

## 2021-11-03 ENCOUNTER — APPOINTMENT (OUTPATIENT)
Dept: PHYSICAL THERAPY | Age: 46
End: 2021-11-03